# Patient Record
Sex: MALE | URBAN - METROPOLITAN AREA
[De-identification: names, ages, dates, MRNs, and addresses within clinical notes are randomized per-mention and may not be internally consistent; named-entity substitution may affect disease eponyms.]

---

## 2020-06-17 ENCOUNTER — OFFICE VISIT (OUTPATIENT)
Dept: URBAN - METROPOLITAN AREA TELEHEALTH 2 | Facility: TELEHEALTH | Age: 40
End: 2020-06-17
Payer: COMMERCIAL

## 2020-06-17 DIAGNOSIS — K51.80 CHRONIC PANCOLONIC ULCERATIVE COLITIS: ICD-10-CM

## 2020-06-17 PROCEDURE — 99202 OFFICE O/P NEW SF 15 MIN: CPT | Performed by: INTERNAL MEDICINE

## 2020-06-17 PROCEDURE — G9903 PT SCRN TBCO ID AS NON USER: HCPCS | Performed by: INTERNAL MEDICINE

## 2020-06-17 PROCEDURE — G8420 CALC BMI NORM PARAMETERS: HCPCS | Performed by: INTERNAL MEDICINE

## 2020-06-17 PROCEDURE — G8430 EC AT DOC MEDREC PT NOT ELIG: HCPCS | Performed by: INTERNAL MEDICINE

## 2020-06-17 PROCEDURE — 1036F TOBACCO NON-USER: CPT | Performed by: INTERNAL MEDICINE

## 2020-06-17 RX ORDER — MESALAMINE 1.2 G/1
4 TABLET, DELAYED RELEASE ORAL QD
Qty: 360 | Refills: 1

## 2020-06-17 RX ORDER — MESALAMINE 1.2 G/1
TABLET, DELAYED RELEASE ORAL
Qty: 0 | Refills: 0 | Status: ACTIVE | COMMUNITY
Start: 1900-01-01 | End: 1900-01-01

## 2020-06-17 NOTE — PHYSICAL EXAM HENT:
Head , normocephalic , atraumatic , Face , Face within normal limits , Ears , External ears within normal limits , Nose/Nasopharynx , External nose  normal appearance , Mouth and Throat , Oral cavity appearance normal , Head , normocephalic , atraumatic , Face , Face within normal limits , Ears , External ears within normal limits , Nose/Nasopharynx , External nose  normal appearance , Mouth and Throat , Oral cavity appearance normal

## 2020-08-12 ENCOUNTER — LAB OUTSIDE AN ENCOUNTER (OUTPATIENT)
Dept: URBAN - METROPOLITAN AREA CLINIC 118 | Facility: CLINIC | Age: 40
End: 2020-08-12

## 2020-08-13 LAB
A/G RATIO: 1.5
ALBUMIN: 4.6
ALKALINE PHOSPHATASE: 61
ALT (SGPT): 30
AST (SGOT): 28
BILIRUBIN, TOTAL: 0.3
BUN/CREATININE RATIO: 20
BUN: 19
CALCIUM: 9.8
CARBON DIOXIDE, TOTAL: 24
CHLORIDE: 102
CREATININE: 0.97
EGFR IF AFRICN AM: 113
EGFR IF NONAFRICN AM: 98
GLOBULIN, TOTAL: 3.1
GLUCOSE: 104
HEMATOCRIT: 45.3
HEMOGLOBIN: 15.4
MCH: 30.6
MCHC: 34
MCV: 90
NRBC: (no result)
PLATELETS: 385
POTASSIUM: 4.4
PROTEIN, TOTAL: 7.7
RBC: 5.04
RDW: 13.2
SODIUM: 141
WBC: 7.1

## 2020-09-30 ENCOUNTER — TELEPHONE ENCOUNTER (OUTPATIENT)
Dept: URBAN - METROPOLITAN AREA CLINIC 98 | Facility: CLINIC | Age: 40
End: 2020-09-30

## 2020-09-30 PROBLEM — 444548001: Status: ACTIVE | Noted: 2020-06-17

## 2020-10-02 ENCOUNTER — TELEPHONE ENCOUNTER (OUTPATIENT)
Dept: URBAN - METROPOLITAN AREA CLINIC 98 | Facility: CLINIC | Age: 40
End: 2020-10-02

## 2020-10-02 ENCOUNTER — OFFICE VISIT (OUTPATIENT)
Dept: URBAN - METROPOLITAN AREA SURGERY CENTER 18 | Facility: SURGERY CENTER | Age: 40
End: 2020-10-02
Payer: COMMERCIAL

## 2020-10-02 ENCOUNTER — TELEPHONE ENCOUNTER (OUTPATIENT)
Dept: URBAN - METROPOLITAN AREA CLINIC 92 | Facility: CLINIC | Age: 40
End: 2020-10-02

## 2020-10-02 ENCOUNTER — CLAIMS CREATED FROM THE CLAIM WINDOW (OUTPATIENT)
Dept: URBAN - METROPOLITAN AREA CLINIC 4 | Facility: CLINIC | Age: 40
End: 2020-10-02
Payer: COMMERCIAL

## 2020-10-02 DIAGNOSIS — K51.011: ICD-10-CM

## 2020-10-02 DIAGNOSIS — K51.919 ULCERATIVE COLITIS, UNSPECIFIED WITH UNSPECIFIED COMPLICATIONS: ICD-10-CM

## 2020-10-02 PROCEDURE — 88341 IMHCHEM/IMCYTCHM EA ADD ANTB: CPT | Performed by: PATHOLOGY

## 2020-10-02 PROCEDURE — 88342 IMHCHEM/IMCYTCHM 1ST ANTB: CPT | Performed by: PATHOLOGY

## 2020-10-02 PROCEDURE — 45380 COLONOSCOPY AND BIOPSY: CPT | Performed by: INTERNAL MEDICINE

## 2020-10-02 PROCEDURE — G9937 DIG OR SURV COLSCO: HCPCS | Performed by: INTERNAL MEDICINE

## 2020-10-02 PROCEDURE — 88305 TISSUE EXAM BY PATHOLOGIST: CPT | Performed by: PATHOLOGY

## 2020-10-02 PROCEDURE — G8907 PT DOC NO EVENTS ON DISCHARG: HCPCS | Performed by: INTERNAL MEDICINE

## 2020-10-02 RX ORDER — MESALAMINE 1.2 G/1
4 TABLET, DELAYED RELEASE ORAL QD
Qty: 360

## 2020-10-02 RX ORDER — MESALAMINE 1.2 G/1
4 TABLET, DELAYED RELEASE ORAL QD
Qty: 360 | Refills: 1 | Status: ACTIVE | COMMUNITY

## 2020-10-02 RX ORDER — CHLORDIAZEPOXIDE HYDROCHLORIDE AND CLIDINIUM BROMIDE 5; 2.5 MG/1; MG/1
1 CAPSULE ORAL BID PRN
Qty: 20 | Refills: 0 | OUTPATIENT
Start: 2020-10-02 | End: 2020-10-12

## 2020-10-02 RX ORDER — PREDNISONE 20 MG/1
3 TABLET ORAL ONCE A DAY
Qty: 90
Start: 2020-10-02

## 2020-10-02 RX ORDER — PREDNISONE 20 MG/1
3 TABLET ORAL ONCE A DAY
Qty: 90 | Refills: 1 | OUTPATIENT
Start: 2020-10-02 | End: 2020-11-30

## 2020-10-03 ENCOUNTER — LAB OUTSIDE AN ENCOUNTER (OUTPATIENT)
Dept: URBAN - METROPOLITAN AREA CLINIC 118 | Facility: CLINIC | Age: 40
End: 2020-10-03

## 2020-10-04 ENCOUNTER — TELEPHONE ENCOUNTER (OUTPATIENT)
Dept: URBAN - METROPOLITAN AREA CLINIC 98 | Facility: CLINIC | Age: 40
End: 2020-10-04

## 2020-10-12 ENCOUNTER — TELEPHONE ENCOUNTER (OUTPATIENT)
Dept: URBAN - METROPOLITAN AREA CLINIC 92 | Facility: CLINIC | Age: 40
End: 2020-10-12

## 2020-10-12 ENCOUNTER — TELEPHONE ENCOUNTER (OUTPATIENT)
Dept: URBAN - METROPOLITAN AREA CLINIC 98 | Facility: CLINIC | Age: 40
End: 2020-10-12

## 2020-10-12 LAB
C. DIFFICILE CHEK (GDH), STOOL - QDX: NEGATIVE
C. DIFFICILE TOXIN A/B, STOOL - QDX: NEGATIVE
GASTROINTESTINAL PATHOGEN: (no result)

## 2020-10-12 RX ORDER — CHLORDIAZEPOXIDE HYDROCHLORIDE AND CLIDINIUM BROMIDE 5; 2.5 MG/1; MG/1
1 CAPSULE ORAL BID PRN
Qty: 20 | Refills: 0
Start: 2020-10-02

## 2020-10-13 ENCOUNTER — TELEPHONE ENCOUNTER (OUTPATIENT)
Dept: URBAN - METROPOLITAN AREA CLINIC 98 | Facility: CLINIC | Age: 40
End: 2020-10-13

## 2020-10-14 ENCOUNTER — TELEPHONE ENCOUNTER (OUTPATIENT)
Dept: URBAN - METROPOLITAN AREA CLINIC 98 | Facility: CLINIC | Age: 40
End: 2020-10-14

## 2020-10-14 RX ORDER — CHLORDIAZEPOXIDE HYDROCHLORIDE AND CLIDINIUM BROMIDE 5; 2.5 MG/1; MG/1
1 CAPSULE ORAL BID PRN
Qty: 20 | Refills: 0 | Status: ACTIVE | COMMUNITY
Start: 2020-10-02

## 2020-10-14 RX ORDER — PREDNISONE 20 MG/1
3 TABLET ORAL ONCE A DAY
Qty: 90 | Status: ACTIVE | COMMUNITY
Start: 2020-10-02

## 2020-10-14 RX ORDER — HYOSCYAMINE SULFATE 0.12 MG/1
1 TABLET AS NEEDED TABLET ORAL
Qty: 30 | Refills: 1 | OUTPATIENT

## 2020-10-14 RX ORDER — MESALAMINE 1.2 G/1
4 TABLET, DELAYED RELEASE ORAL QD
Qty: 360 | Refills: 1 | Status: ACTIVE | COMMUNITY

## 2020-10-20 ENCOUNTER — TELEPHONE ENCOUNTER (OUTPATIENT)
Dept: URBAN - METROPOLITAN AREA CLINIC 98 | Facility: CLINIC | Age: 40
End: 2020-10-20

## 2020-10-21 ENCOUNTER — OUT OF OFFICE VISIT (OUTPATIENT)
Dept: URBAN - METROPOLITAN AREA MEDICAL CENTER 27 | Facility: MEDICAL CENTER | Age: 40
End: 2020-10-21
Payer: COMMERCIAL

## 2020-10-21 DIAGNOSIS — D64.89 ANEMIA DUE TO OTHER CAUSE: ICD-10-CM

## 2020-10-21 DIAGNOSIS — R93.3 ABN FINDINGS-GI TRACT: ICD-10-CM

## 2020-10-21 DIAGNOSIS — D72.829 ELEVATED WBC COUNT: ICD-10-CM

## 2020-10-21 DIAGNOSIS — K51.80 CHRONIC PANCOLONIC ULCERATIVE COLITIS: ICD-10-CM

## 2020-10-21 PROCEDURE — 99254 IP/OBS CNSLTJ NEW/EST MOD 60: CPT | Performed by: INTERNAL MEDICINE

## 2020-10-21 PROCEDURE — 99232 SBSQ HOSP IP/OBS MODERATE 35: CPT | Performed by: INTERNAL MEDICINE

## 2020-10-23 ENCOUNTER — OUT OF OFFICE VISIT (OUTPATIENT)
Dept: URBAN - METROPOLITAN AREA MEDICAL CENTER 27 | Facility: MEDICAL CENTER | Age: 40
End: 2020-10-23
Payer: COMMERCIAL

## 2020-10-23 DIAGNOSIS — D72.828 HIGH BLOOD MONOCYTE COUNT: ICD-10-CM

## 2020-10-23 DIAGNOSIS — D64.89 ANEMIA DUE TO OTHER CAUSE: ICD-10-CM

## 2020-10-23 DIAGNOSIS — D47.3 THROMBOCYTOSIS: ICD-10-CM

## 2020-10-23 DIAGNOSIS — K51.018 CHRONIC ULCERATIVE ILEOCOLITIS WITH OTHER COMPLICATION: ICD-10-CM

## 2020-10-23 PROCEDURE — 99233 SBSQ HOSP IP/OBS HIGH 50: CPT | Performed by: INTERNAL MEDICINE

## 2020-10-24 ENCOUNTER — OUT OF OFFICE VISIT (OUTPATIENT)
Dept: URBAN - METROPOLITAN AREA MEDICAL CENTER 27 | Facility: MEDICAL CENTER | Age: 40
End: 2020-10-24
Payer: COMMERCIAL

## 2020-10-24 DIAGNOSIS — K92.1 BLACK STOOL: ICD-10-CM

## 2020-10-24 DIAGNOSIS — R97.20 ELEVATED PSA: ICD-10-CM

## 2020-10-24 DIAGNOSIS — K51.018 CHRONIC ULCERATIVE ILEOCOLITIS WITH OTHER COMPLICATION: ICD-10-CM

## 2020-10-24 DIAGNOSIS — R10.84 ABDOMINAL CRAMPING, GENERALIZED: ICD-10-CM

## 2020-10-24 PROCEDURE — 99232 SBSQ HOSP IP/OBS MODERATE 35: CPT | Performed by: INTERNAL MEDICINE

## 2020-10-26 ENCOUNTER — OFFICE VISIT (OUTPATIENT)
Dept: URBAN - METROPOLITAN AREA TELEHEALTH 2 | Facility: TELEHEALTH | Age: 40
End: 2020-10-26
Payer: COMMERCIAL

## 2020-10-26 DIAGNOSIS — K92.1 HEMATOCHEZIA: ICD-10-CM

## 2020-10-26 DIAGNOSIS — R19.7 DIARRHEA: ICD-10-CM

## 2020-10-26 DIAGNOSIS — R10.84 ABDOMINAL CRAMPING, GENERALIZED: ICD-10-CM

## 2020-10-26 DIAGNOSIS — K51.00 UNIVERSAL ULCERATIVE COLITIS WITHOUT COMPLICATION: ICD-10-CM

## 2020-10-26 PROCEDURE — G9903 PT SCRN TBCO ID AS NON USER: HCPCS | Performed by: INTERNAL MEDICINE

## 2020-10-26 PROCEDURE — G9622 NO UNHEAL ETOH USER: HCPCS | Performed by: INTERNAL MEDICINE

## 2020-10-26 PROCEDURE — 3017F COLORECTAL CA SCREEN DOC REV: CPT | Performed by: INTERNAL MEDICINE

## 2020-10-26 PROCEDURE — G8482 FLU IMMUNIZE ORDER/ADMIN: HCPCS | Performed by: INTERNAL MEDICINE

## 2020-10-26 PROCEDURE — 99215 OFFICE O/P EST HI 40 MIN: CPT | Performed by: INTERNAL MEDICINE

## 2020-10-26 PROCEDURE — G8427 DOCREV CUR MEDS BY ELIG CLIN: HCPCS | Performed by: INTERNAL MEDICINE

## 2020-10-26 PROCEDURE — 1036F TOBACCO NON-USER: CPT | Performed by: INTERNAL MEDICINE

## 2020-10-26 PROCEDURE — G8418 CALC BMI BLW LOW PARAM F/U: HCPCS | Performed by: INTERNAL MEDICINE

## 2020-10-26 RX ORDER — CHLORDIAZEPOXIDE HYDROCHLORIDE AND CLIDINIUM BROMIDE 5; 2.5 MG/1; MG/1
1 CAPSULE ORAL BID PRN
Qty: 20 | Refills: 0 | Status: ACTIVE | COMMUNITY
Start: 2020-10-02

## 2020-10-26 RX ORDER — MESALAMINE 1.2 G/1
4 TABLET, DELAYED RELEASE ORAL QD
Qty: 360 | Refills: 1 | Status: ACTIVE | COMMUNITY

## 2020-10-26 RX ORDER — HYOSCYAMINE SULFATE 0.12 MG/1
1 TABLET AS NEEDED TABLET ORAL
Qty: 30 | Refills: 1 | Status: ACTIVE | COMMUNITY

## 2020-10-26 RX ORDER — PREDNISONE 10 MG/1
4 TABLET TABLET ORAL ONCE A DAY
Qty: 120 TABLET | Refills: 0 | OUTPATIENT
Start: 2020-10-26 | End: 2020-11-24

## 2020-10-26 RX ORDER — BALSALAZIDE DISODIUM 750 MG/1
9 CAPSULES CAPSULE ORAL QD
Qty: 270 CAPSULE | Refills: 11 | OUTPATIENT
Start: 2020-10-26 | End: 2021-10-21

## 2020-10-26 RX ORDER — PREDNISONE 20 MG/1
3 TABLET ORAL ONCE A DAY
Qty: 90 | Status: ACTIVE | COMMUNITY
Start: 2020-10-02

## 2020-10-26 NOTE — HPI-TODAY'S VISIT:
Cory Hilton is a 41 y/o indiv with pan-UC, currently on mesalamine (4 tab), here for evaluation of refractory dz. . Pt is referred by Dr. Chen. . Pt was diagnosed with UC 1999.  He was started on azulfidine/sulfasalazine, developed hives on it.  Eventually he was started on another ASA.  Eventually he started on mesalamine.  He was hospitalized in 2020 for flares. . Today on 10/26/2020, pt reports that he was started on prednisone 40mg.  He was recently hospitalized at [facility] for dehydration and abdominal pain.  He was found to have low electrolytes levels and given IV steroids and IV pain control.  He also has had a lot of rectal bleeding.  His Hgb is 8.8 currently (at [facility]).  He has up to 4 BM per day, with blood and mucus. . SH: no smoke/occasional etoh;  FH: No IBD PMH: UC; hyperlipidemia; enlarged prostate . 10/2020: Colon, Random Biopsy: DIFFUSE CHRONIC ACTIVE COLITIS WITH ULCER AND GRANULATION TISSUE, CONSISTENT WITH ULCERATIVE COLITIS. See Comment. Negative for Infectious Organisms, Dysplasia or Malignancy. COMMENT: The biopsies show a diffuse and uniform chronic active colitis with a universal involvement of the rectum and absence of granulomas, supporting a diagnosis of ulcerative colitis. The presence of histological features of chronicity excludes acute infectious colitis. . 9/2019: A patchy area of mucosa in the terminal ileum was mildly nodular.  Biopsies were taken with a cold forceps for histology. Estimated blood loss was minimal. The remainder of the exam in the terminal ileum was normal. A patchy area of mild to moderate erythematous, granular and scarred mucosa was found in the rectum, in the recto- sigmoid colon, in the sigmoid colon, in the descending colon, at the splenic flexure, in the transverse colon, at the hepatic flexure, in the ascending colon and in the cecum.  Biopsies were taken with a cold forceps for histology. Estimated blood loss was minimal. The exam was otherwise normal throughout the examined colon. . A.	Terminal	I leum	,	Biopsy: -No	diagnostic	abnormality. -Normal	villous	architecture. -There	is	no	evidence	of	active	or	chronic	enteritis. -Negative	for	dysplasia	and	malignancy. B.	Random	Colon	,	Biopsy: -Chronic	active	colitis,	mild,	consistent	with	history	of	Ulcerative	Colitis. -No	granulomas,	negative	for	dysplasia	or	malignancy. . 10/2018: A.	Random	Colon	,	Biopsy: -Chronic	active	colitis,	mild,	consistent	with	history	of	Ulcerative	Colitis. -No	granulomas,	negative	for	dysplasia	or	malignancy. . 6/2017: A. TERMINAL ILEUM (BIOPSY), R/O COLITIS ILEAL MUCOSA WITH NO SIGNIFICANT HISTOPATHOLOGIC CHANGES. Negative for ileitis. B. CECUM (BIOPSY), R/O COLITIS COLONIC MUCOSA WITH NO SIGNIFICANT HISTOPATHOLOGIC CHANGES. Negative for crypt distortion, disarray, and significant inflammation. Negative for dysplasia. C. ASCENDING COLON (BIOPSY), R/O COLITIS COLONIC MUCOSA WITH NO SIGNIFICANT HISTOPATHOLOGIC CHANGES. Negative for crypt distortion, disarray, and significant inflammation. Negative for dysplasia. D. TRANSVERSE COLON (BIOPSY), R/O COLITIS COLONIC MUCOSA WITH NO SIGNIFICANT HISTOPATHOLOGIC CHANGES. Negative for crypt distortion, disarray, and significant inflammation. Negative for dysplasia. E. DESCENDING COLON (BIOPSY), R/O COLITIS COLONIC MUCOSA WITH NO SIGNIFICANT HISTOPATHOLOGIC CHANGES. Negative for crypt distortion, disarray, and significant inflammation. Negative for dysplasia. F. SIGMOID COLON (BIOPSY), R/O COLITIS COLONIC MUCOSA WITH FOCAL ACUTE HEMORRHAGE AND NO SIGNIFICANT HISTOPATHOLOGIC  CHANGES. Negative for crypt distortion, disarray, and significant inflammation. Negative for dysplasia. G. RECTUM (BIOPSY), R/O COLITIS QUIESCENT ULCERATIVE COLITIS WITH FOCAL LYMPHOID AGGREGATE. Negative for acute inflammation and dysplasia. . 6/2016: A. CECUM-BIOPSY MILD CHRONIC ULCERATIVE COLITIS WITH MINIMAL ACTIVITY.  NO EVIDENCE OF DYSPLASIA. B. ASCENDING COLON-BIOPSY CONSISTENT WITH QUIESCENT ULCERATIVE COLITIS, NO ACTIVE INFLAMMATION OR DYSPLASIA. C. TRANSVERSE COLON-BIOPSY CONSISTENT WITH QUIESCENT ULCERATIVE COLITIS, NO ACTIVE INFLAMMATION OR DYSPLASIA. D. DESCENDING COLON-BIOPSY MILD CHRONIC ULCERATIVE COLITIS WITH MINIMAL ACTIVITY.  NO EVIDENCE OF DYSPLASIA. E. SIGMOID COLON-BIOPSY MILD CHRONIC ULCERATIVE COLITIS WITH MINIMAL ACTIVITY.  NO EVIDENCE OF DYSPLASIA. F. RECTUM-BIOPSY MILD CHRONIC ULCERATIVE PROCTITIS WITH MINIMAL ACTIVITY.  NO EVIDENCE OF DYSPLASIA. .

## 2020-11-10 ENCOUNTER — WEB ENCOUNTER (OUTPATIENT)
Dept: URBAN - METROPOLITAN AREA CLINIC 118 | Facility: CLINIC | Age: 40
End: 2020-11-10

## 2020-11-11 ENCOUNTER — OFFICE VISIT (OUTPATIENT)
Dept: URBAN - METROPOLITAN AREA TELEHEALTH 2 | Facility: TELEHEALTH | Age: 40
End: 2020-11-11
Payer: COMMERCIAL

## 2020-11-11 DIAGNOSIS — D50.8 OTHER IRON DEFICIENCY ANEMIAS: ICD-10-CM

## 2020-11-11 DIAGNOSIS — K92.1 HEMATOCHEZIA: ICD-10-CM

## 2020-11-11 DIAGNOSIS — R19.7 DIARRHEA: ICD-10-CM

## 2020-11-11 DIAGNOSIS — R10.84 ABDOMINAL PAIN, GENERALIZED: ICD-10-CM

## 2020-11-11 DIAGNOSIS — K51.80 OTHER ULCERATIVE COLITIS WITHOUT COMPLICATION: ICD-10-CM

## 2020-11-11 PROCEDURE — 1036F TOBACCO NON-USER: CPT | Performed by: INTERNAL MEDICINE

## 2020-11-11 PROCEDURE — 3017F COLORECTAL CA SCREEN DOC REV: CPT | Performed by: INTERNAL MEDICINE

## 2020-11-11 PROCEDURE — G9622 NO UNHEAL ETOH USER: HCPCS | Performed by: INTERNAL MEDICINE

## 2020-11-11 PROCEDURE — 99215 OFFICE O/P EST HI 40 MIN: CPT | Performed by: INTERNAL MEDICINE

## 2020-11-11 PROCEDURE — G8420 CALC BMI NORM PARAMETERS: HCPCS | Performed by: INTERNAL MEDICINE

## 2020-11-11 PROCEDURE — 99214 OFFICE O/P EST MOD 30 MIN: CPT | Performed by: INTERNAL MEDICINE

## 2020-11-11 PROCEDURE — G8427 DOCREV CUR MEDS BY ELIG CLIN: HCPCS | Performed by: INTERNAL MEDICINE

## 2020-11-11 PROCEDURE — G8482 FLU IMMUNIZE ORDER/ADMIN: HCPCS | Performed by: INTERNAL MEDICINE

## 2020-11-11 PROCEDURE — G9903 PT SCRN TBCO ID AS NON USER: HCPCS | Performed by: INTERNAL MEDICINE

## 2020-11-11 RX ORDER — PREDNISONE 10 MG/1
4 TABLET TABLET ORAL ONCE A DAY
Qty: 120 TABLET | Refills: 0 | Status: ACTIVE | COMMUNITY
Start: 2020-10-26 | End: 2020-11-24

## 2020-11-11 RX ORDER — BALSALAZIDE DISODIUM 750 MG/1
9 CAPSULES CAPSULE ORAL QD
Qty: 270 CAPSULE | Refills: 11 | Status: ACTIVE | COMMUNITY
Start: 2020-10-26 | End: 2021-10-21

## 2020-11-11 RX ORDER — MESALAMINE 1.2 G/1
4 TABLET, DELAYED RELEASE ORAL QD
Qty: 360 | Refills: 1 | Status: ACTIVE | COMMUNITY

## 2020-11-11 RX ORDER — PREDNISONE 10 MG/1
4 TABLET TABLET ORAL ONCE A DAY
Qty: 120 TABLET | Refills: 0 | OUTPATIENT

## 2020-11-11 RX ORDER — HYOSCYAMINE SULFATE 0.12 MG/1
1 TABLET AS NEEDED TABLET ORAL
Qty: 30 | Refills: 1 | Status: ACTIVE | COMMUNITY

## 2020-11-11 RX ORDER — CHLORDIAZEPOXIDE HYDROCHLORIDE AND CLIDINIUM BROMIDE 5; 2.5 MG/1; MG/1
1 CAPSULE ORAL BID PRN
Qty: 20 | Refills: 0 | Status: ACTIVE | COMMUNITY
Start: 2020-10-02

## 2020-11-11 RX ORDER — BALSALAZIDE DISODIUM 750 MG/1
9 CAPSULES CAPSULE ORAL QD
Qty: 810 CAPSULE | Refills: 4 | OUTPATIENT

## 2020-11-11 RX ORDER — PREDNISONE 20 MG/1
3 TABLET ORAL ONCE A DAY
Qty: 90 | Status: ACTIVE | COMMUNITY
Start: 2020-10-02

## 2020-11-11 NOTE — HPI-OTHER HISTORIES
Cory Hilton is a 39 y/o indiv with pan-UC, currently on Balasalizde 9 tab, here for evaluation of refractory dz. . Pt is referred by Dr. Chen. . Pt was diagnosed with UC 1999.  He was started on azulfidine/sulfasalazine, developed hives on it.  Eventually he was started on another ASA.  Eventually he started on mesalamine.  He was hospitalized in 2020 for flares. . Previously on 10/26/2020, pt reports that he was started on prednisone 40mg.  He was recently hospitalized at [facility] for dehydration and abdominal pain.  He was found to have low electrolytes levels and given IV steroids and IV pain control.  He also has had a lot of rectal bleeding.  His Hgb is 8.8 currently (at [facility]).  He has up to 4 BM per day, with blood and mucus. . Today 11/11/2020, pt reports that originally after hospital discharge, he was having severe pain and discomfort and lost 25lbs of weight.  He then started to force himself to walk around.  He has also had difficulty eating and drinking.  About 1 week ago, things started to get better 3-4 BM per day. He is on 30mg of prednisone.  No blood or mucus in the stool.  He has pasty stool. .  SH: no smoke/occasional etoh;  FH: No IBD PMH: UC; hyperlipidemia; enlarged prostate . 10/2020: Colon, Random Biopsy: DIFFUSE CHRONIC ACTIVE COLITIS WITH ULCER AND GRANULATION TISSUE, CONSISTENT WITH ULCERATIVE COLITIS. See Comment. Negative for Infectious Organisms, Dysplasia or Malignancy. COMMENT: The biopsies show a diffuse and uniform chronic active colitis with a universal involvement of the rectum and absence of granulomas, supporting a diagnosis of ulcerative colitis. The presence of histological features of chronicity excludes acute infectious colitis. . 9/2019: A patchy area of mucosa in the terminal ileum was mildly nodular.  Biopsies were taken with a cold forceps for histology. Estimated blood loss was minimal. The remainder of the exam in the terminal ileum was normal. A patchy area of mild to moderate erythematous, granular and scarred mucosa was found in the rectum, in the recto- sigmoid colon, in the sigmoid colon, in the descending colon, at the splenic flexure, in the transverse colon, at the hepatic flexure, in the ascending colon and in the cecum.  Biopsies were taken with a cold forceps for histology. Estimated blood loss was minimal. The exam was otherwise normal throughout the examined colon. . A.	Terminal	I leum	,	Biopsy: -No	diagnostic	abnormality. -Normal	villous	architecture. -There	is	no	evidence	of	active	or	chronic	enteritis. -Negative	for	dysplasia	and	malignancy. B.	Random	Colon	,	Biopsy: -Chronic	active	colitis,	mild,	consistent	with	history	of	Ulcerative	Colitis. -No	granulomas,	negative	for	dysplasia	or	malignancy. . 10/2018: A.	Random	Colon	,	Biopsy: -Chronic	active	colitis,	mild,	consistent	with	history	of	Ulcerative	Colitis. -No	granulomas,	negative	for	dysplasia	or	malignancy. . 6/2017: A. TERMINAL ILEUM (BIOPSY), R/O COLITIS ILEAL MUCOSA WITH NO SIGNIFICANT HISTOPATHOLOGIC CHANGES. Negative for ileitis. B. CECUM (BIOPSY), R/O COLITIS COLONIC MUCOSA WITH NO SIGNIFICANT HISTOPATHOLOGIC CHANGES. Negative for crypt distortion, disarray, and significant inflammation. Negative for dysplasia. C. ASCENDING COLON (BIOPSY), R/O COLITIS COLONIC MUCOSA WITH NO SIGNIFICANT HISTOPATHOLOGIC CHANGES. Negative for crypt distortion, disarray, and significant inflammation. Negative for dysplasia. D. TRANSVERSE COLON (BIOPSY), R/O COLITIS COLONIC MUCOSA WITH NO SIGNIFICANT HISTOPATHOLOGIC CHANGES. Negative for crypt distortion, disarray, and significant inflammation. Negative for dysplasia. E. DESCENDING COLON (BIOPSY), R/O COLITIS COLONIC MUCOSA WITH NO SIGNIFICANT HISTOPATHOLOGIC CHANGES. Negative for crypt distortion, disarray, and significant inflammation. Negative for dysplasia. F. SIGMOID COLON (BIOPSY), R/O COLITIS COLONIC MUCOSA WITH FOCAL ACUTE HEMORRHAGE AND NO SIGNIFICANT HISTOPATHOLOGIC  CHANGES. Negative for crypt distortion, disarray, and significant inflammation. Negative for dysplasia. G. RECTUM (BIOPSY), R/O COLITIS QUIESCENT ULCERATIVE COLITIS WITH FOCAL LYMPHOID AGGREGATE. Negative for acute inflammation and dysplasia. . 6/2016: A. CECUM-BIOPSY MILD CHRONIC ULCERATIVE COLITIS WITH MINIMAL ACTIVITY.  NO EVIDENCE OF DYSPLASIA. B. ASCENDING COLON-BIOPSY CONSISTENT WITH QUIESCENT ULCERATIVE COLITIS, NO ACTIVE INFLAMMATION OR DYSPLASIA. C. TRANSVERSE COLON-BIOPSY CONSISTENT WITH QUIESCENT ULCERATIVE COLITIS, NO ACTIVE INFLAMMATION OR DYSPLASIA. D. DESCENDING COLON-BIOPSY MILD CHRONIC ULCERATIVE COLITIS WITH MINIMAL ACTIVITY.  NO EVIDENCE OF DYSPLASIA. E. SIGMOID COLON-BIOPSY MILD CHRONIC ULCERATIVE COLITIS WITH MINIMAL ACTIVITY.  NO EVIDENCE OF DYSPLASIA. F. RECTUM-BIOPSY MILD CHRONIC ULCERATIVE PROCTITIS WITH MINIMAL ACTIVITY.  NO EVIDENCE OF DYSPLASIA. .

## 2020-11-14 LAB
BASO (ABSOLUTE): 0
BASOS: 0
EOS (ABSOLUTE): 0
EOS: 0
FERRITIN, SERUM: 32
HEMATOCRIT: 30.4
HEMATOLOGY COMMENTS:: (no result)
HEMOGLOBIN: 9.2
IMMATURE CELLS: (no result)
IMMATURE GRANS (ABS): 0.1
IMMATURE GRANULOCYTES: 1
IRON BIND.CAP.(TIBC): 307
IRON SATURATION: 7
IRON: 22
LYMPHS (ABSOLUTE): 0.4
LYMPHS: 4
MCH: 25.2
MCHC: 30.3
MCV: 83
MONOCYTES(ABSOLUTE): 0.2
MONOCYTES: 2
NEUTROPHILS (ABSOLUTE): 11.3
NEUTROPHILS: 93
NRBC: 1
PLATELETS: 656
RBC: 3.65
RDW: 15.2
UIBC: 285
WBC: 12

## 2020-11-16 ENCOUNTER — WEB ENCOUNTER (OUTPATIENT)
Dept: URBAN - METROPOLITAN AREA CLINIC 118 | Facility: CLINIC | Age: 40
End: 2020-11-16

## 2020-11-16 ENCOUNTER — WEB ENCOUNTER (OUTPATIENT)
Dept: URBAN - METROPOLITAN AREA CLINIC 96 | Facility: CLINIC | Age: 40
End: 2020-11-16

## 2020-11-16 RX ORDER — PREDNISONE 20 MG/1
3 TABLET ORAL ONCE A DAY
Qty: 90 | Status: ACTIVE | COMMUNITY
Start: 2020-10-02

## 2020-11-16 RX ORDER — HYOSCYAMINE SULFATE 0.12 MG/1
1 TABLET AS NEEDED TABLET ORAL
Qty: 120 | Refills: 3 | OUTPATIENT
Start: 2020-12-10 | End: 2021-04-09

## 2020-11-16 RX ORDER — HYOSCYAMINE SULFATE 0.12 MG/1
1 TABLET AS NEEDED TABLET ORAL
Qty: 30 | Refills: 1 | Status: ACTIVE | COMMUNITY

## 2020-11-16 RX ORDER — PREDNISONE 10 MG/1
1 TABLET TABLET ORAL ONCE A DAY
Qty: 30 TABLET | Refills: 0 | OUTPATIENT
Start: 2020-12-10 | End: 2021-01-09

## 2020-11-16 RX ORDER — MESALAMINE 1.2 G/1
4 TABLET, DELAYED RELEASE ORAL QD
Qty: 360 | Refills: 1 | Status: ACTIVE | COMMUNITY

## 2020-11-16 RX ORDER — BALSALAZIDE DISODIUM 750 MG/1
9 CAPSULES CAPSULE ORAL QD
Qty: 810 CAPSULE | Refills: 4 | Status: ACTIVE | COMMUNITY

## 2020-11-16 RX ORDER — CHLORDIAZEPOXIDE HYDROCHLORIDE AND CLIDINIUM BROMIDE 5; 2.5 MG/1; MG/1
1 CAPSULE ORAL BID PRN
Qty: 20 | Refills: 0 | Status: ACTIVE | COMMUNITY
Start: 2020-10-02

## 2020-11-16 RX ORDER — PREDNISONE 10 MG/1
4 TABLET TABLET ORAL ONCE A DAY
Qty: 120 TABLET | Refills: 0 | Status: ACTIVE | COMMUNITY

## 2020-11-18 ENCOUNTER — LAB OUTSIDE AN ENCOUNTER (OUTPATIENT)
Dept: URBAN - METROPOLITAN AREA TELEHEALTH 2 | Facility: TELEHEALTH | Age: 40
End: 2020-11-18

## 2020-12-22 ENCOUNTER — OFFICE VISIT (OUTPATIENT)
Dept: URBAN - METROPOLITAN AREA CLINIC 97 | Facility: CLINIC | Age: 40
End: 2020-12-22
Payer: COMMERCIAL

## 2020-12-22 VITALS
WEIGHT: 155 LBS | HEIGHT: 69 IN | DIASTOLIC BLOOD PRESSURE: 83 MMHG | TEMPERATURE: 97.6 F | RESPIRATION RATE: 18 BRPM | SYSTOLIC BLOOD PRESSURE: 119 MMHG | BODY MASS INDEX: 22.96 KG/M2

## 2020-12-22 DIAGNOSIS — D50.9 ANEMIA, IRON DEFICIENCY: ICD-10-CM

## 2020-12-22 PROCEDURE — 96365 THER/PROPH/DIAG IV INF INIT: CPT | Performed by: INTERNAL MEDICINE

## 2020-12-22 RX ORDER — MESALAMINE 1.2 G/1
4 TABLET, DELAYED RELEASE ORAL QD
Qty: 360 | Refills: 1 | Status: ACTIVE | COMMUNITY

## 2020-12-22 RX ORDER — HYOSCYAMINE SULFATE 0.12 MG/1
1 TABLET AS NEEDED TABLET ORAL
Qty: 30 | Refills: 1 | Status: ACTIVE | COMMUNITY

## 2020-12-22 RX ORDER — HYOSCYAMINE SULFATE 0.12 MG/1
1 TABLET AS NEEDED TABLET ORAL
Qty: 120 | Refills: 3 | Status: ACTIVE | COMMUNITY
Start: 2020-12-10 | End: 2021-04-09

## 2020-12-22 RX ORDER — PREDNISONE 10 MG/1
4 TABLET TABLET ORAL ONCE A DAY
Qty: 120 TABLET | Refills: 0 | Status: ACTIVE | COMMUNITY

## 2020-12-22 RX ORDER — BALSALAZIDE DISODIUM 750 MG/1
9 CAPSULES CAPSULE ORAL QD
Qty: 810 CAPSULE | Refills: 4 | Status: ACTIVE | COMMUNITY

## 2020-12-22 RX ORDER — PREDNISONE 10 MG/1
1 TABLET TABLET ORAL ONCE A DAY
Qty: 30 TABLET | Refills: 0 | Status: ACTIVE | COMMUNITY
Start: 2020-12-10 | End: 2021-01-09

## 2020-12-22 RX ORDER — PREDNISONE 20 MG/1
3 TABLET ORAL ONCE A DAY
Qty: 90 | Status: ACTIVE | COMMUNITY
Start: 2020-10-02

## 2020-12-22 RX ORDER — CHLORDIAZEPOXIDE HYDROCHLORIDE AND CLIDINIUM BROMIDE 5; 2.5 MG/1; MG/1
1 CAPSULE ORAL BID PRN
Qty: 20 | Refills: 0 | Status: ACTIVE | COMMUNITY
Start: 2020-10-02

## 2020-12-29 ENCOUNTER — OFFICE VISIT (OUTPATIENT)
Dept: URBAN - METROPOLITAN AREA CLINIC 97 | Facility: CLINIC | Age: 40
End: 2020-12-29
Payer: COMMERCIAL

## 2020-12-29 VITALS
SYSTOLIC BLOOD PRESSURE: 122 MMHG | BODY MASS INDEX: 22.96 KG/M2 | HEIGHT: 69 IN | WEIGHT: 155 LBS | DIASTOLIC BLOOD PRESSURE: 87 MMHG | TEMPERATURE: 96.4 F | RESPIRATION RATE: 18 BRPM

## 2020-12-29 DIAGNOSIS — D50.8 ANEMIA, DUE TO INADEQUATE IRON INTAKE: ICD-10-CM

## 2020-12-29 PROCEDURE — 96365 THER/PROPH/DIAG IV INF INIT: CPT | Performed by: INTERNAL MEDICINE

## 2020-12-29 RX ORDER — PREDNISONE 20 MG/1
3 TABLET ORAL ONCE A DAY
Qty: 90 | Status: ACTIVE | COMMUNITY
Start: 2020-10-02

## 2020-12-29 RX ORDER — HYOSCYAMINE SULFATE 0.12 MG/1
1 TABLET AS NEEDED TABLET ORAL
Qty: 30 | Refills: 1 | Status: ACTIVE | COMMUNITY

## 2020-12-29 RX ORDER — PREDNISONE 10 MG/1
4 TABLET TABLET ORAL ONCE A DAY
Qty: 120 TABLET | Refills: 0 | Status: ACTIVE | COMMUNITY

## 2020-12-29 RX ORDER — PREDNISONE 10 MG/1
1 TABLET TABLET ORAL ONCE A DAY
Qty: 30 TABLET | Refills: 0 | Status: ACTIVE | COMMUNITY
Start: 2020-12-10 | End: 2021-01-09

## 2020-12-29 RX ORDER — HYOSCYAMINE SULFATE 0.12 MG/1
1 TABLET AS NEEDED TABLET ORAL
Qty: 120 | Refills: 3 | Status: ACTIVE | COMMUNITY
Start: 2020-12-10 | End: 2021-04-09

## 2020-12-29 RX ORDER — CHLORDIAZEPOXIDE HYDROCHLORIDE AND CLIDINIUM BROMIDE 5; 2.5 MG/1; MG/1
1 CAPSULE ORAL BID PRN
Qty: 20 | Refills: 0 | Status: ACTIVE | COMMUNITY
Start: 2020-10-02

## 2020-12-29 RX ORDER — MESALAMINE 1.2 G/1
4 TABLET, DELAYED RELEASE ORAL QD
Qty: 360 | Refills: 1 | Status: ACTIVE | COMMUNITY

## 2020-12-29 RX ORDER — BALSALAZIDE DISODIUM 750 MG/1
9 CAPSULES CAPSULE ORAL QD
Qty: 810 CAPSULE | Refills: 4 | Status: ACTIVE | COMMUNITY

## 2021-02-04 ENCOUNTER — OFFICE VISIT (OUTPATIENT)
Dept: URBAN - METROPOLITAN AREA TELEHEALTH 2 | Facility: TELEHEALTH | Age: 41
End: 2021-02-04
Payer: COMMERCIAL

## 2021-02-04 DIAGNOSIS — R10.84 ABDOMINAL CRAMPING, GENERALIZED: ICD-10-CM

## 2021-02-04 DIAGNOSIS — D50.9 IRON DEFICIENCY ANEMIA: ICD-10-CM

## 2021-02-04 DIAGNOSIS — K51.00 UNIVERSAL ULCERATIVE COLITIS WITHOUT COMPLICATION: ICD-10-CM

## 2021-02-04 DIAGNOSIS — R19.7 DIARRHEA: ICD-10-CM

## 2021-02-04 PROCEDURE — G8420 CALC BMI NORM PARAMETERS: HCPCS | Performed by: INTERNAL MEDICINE

## 2021-02-04 PROCEDURE — G9622 NO UNHEAL ETOH USER: HCPCS | Performed by: INTERNAL MEDICINE

## 2021-02-04 PROCEDURE — 99215 OFFICE O/P EST HI 40 MIN: CPT | Performed by: INTERNAL MEDICINE

## 2021-02-04 PROCEDURE — 3017F COLORECTAL CA SCREEN DOC REV: CPT | Performed by: INTERNAL MEDICINE

## 2021-02-04 PROCEDURE — G8427 DOCREV CUR MEDS BY ELIG CLIN: HCPCS | Performed by: INTERNAL MEDICINE

## 2021-02-04 PROCEDURE — G9903 PT SCRN TBCO ID AS NON USER: HCPCS | Performed by: INTERNAL MEDICINE

## 2021-02-04 PROCEDURE — G8482 FLU IMMUNIZE ORDER/ADMIN: HCPCS | Performed by: INTERNAL MEDICINE

## 2021-02-04 PROCEDURE — 1036F TOBACCO NON-USER: CPT | Performed by: INTERNAL MEDICINE

## 2021-02-04 RX ORDER — BALSALAZIDE DISODIUM 750 MG/1
9 CAPSULES CAPSULE ORAL QD
Qty: 810 CAPSULE | Refills: 4 | Status: ACTIVE | COMMUNITY

## 2021-02-04 RX ORDER — PREDNISONE 20 MG/1
3 TABLET ORAL ONCE A DAY
Qty: 90 | Status: DISCONTINUED | COMMUNITY
Start: 2020-10-02

## 2021-02-04 RX ORDER — PREDNISONE 10 MG/1
4 TABLET TABLET ORAL ONCE A DAY
Qty: 120 TABLET | Refills: 0 | OUTPATIENT

## 2021-02-04 RX ORDER — PREDNISONE 10 MG/1
4 TABLET TABLET ORAL ONCE A DAY
Qty: 120 TABLET | Refills: 0 | Status: ACTIVE | COMMUNITY

## 2021-02-04 RX ORDER — HYOSCYAMINE SULFATE 0.12 MG/1
1 TABLET AS NEEDED TABLET ORAL
Qty: 30 | Refills: 1 | Status: ACTIVE | COMMUNITY

## 2021-02-04 RX ORDER — CHLORDIAZEPOXIDE HYDROCHLORIDE AND CLIDINIUM BROMIDE 5; 2.5 MG/1; MG/1
1 CAPSULE ORAL BID PRN
Qty: 20 | Refills: 0 | Status: ACTIVE | COMMUNITY
Start: 2020-10-02

## 2021-02-04 RX ORDER — HYOSCYAMINE SULFATE 0.12 MG/1
1 TABLET AS NEEDED TABLET ORAL
Qty: 120 | Refills: 3 | Status: ACTIVE | COMMUNITY
Start: 2020-12-10 | End: 2021-04-09

## 2021-02-04 RX ORDER — BALSALAZIDE DISODIUM 750 MG/1
9 CAPSULES CAPSULE ORAL QD
Qty: 810 CAPSULE | Refills: 4 | OUTPATIENT

## 2021-02-04 NOTE — HPI-TODAY'S VISIT:
Cory Hilton is a 41 y/o indiv with pan-UC, currently on Balasalizde 9 tab, here for evaluation of refractory dz. . Pt is referred by Dr. Chen. . Pt was diagnosed with UC 1999.  He was started on azulfidine/sulfasalazine, developed hives on it.  Eventually he was started on another ASA.  Eventually he started on mesalamine.  He was hospitalized in 2020 for flares. . Previously on 10/26/2020, pt reports that he was started on prednisone 40mg.  He was recently hospitalized at [facility] for dehydration and abdominal pain.  He was found to have low electrolytes levels and given IV steroids and IV pain control.  He also has had a lot of rectal bleeding.  His Hgb is 8.8 currently (at [facility]).  He has up to 4 BM per day, with blood and mucus. . Previously 11/11/2020, pt reports that originally after hospital discharge, he was having severe pain and discomfort and lost 25lbs of weight.  He then started to force himself to walk around.  He has also had difficulty eating and drinking.  About 1 week ago, things started to get better 3-4 BM per day. He is on 30mg of prednisone.  No blood or mucus in the stool.  He has pasty stool. . Today on 2/4/2021, pt reports that he continues to have liquid stool, has up to 4-5 BM per day.  On prednisone 10mg, gained about 5lbs.  He got his iron infusion, which did give him more energy.    . SH: no smoke/occasional etoh;  FH: No IBD PMH: UC; hyperlipidemia; enlarged prostate . 10/2020: Colon, Random Biopsy: DIFFUSE CHRONIC ACTIVE COLITIS WITH ULCER AND GRANULATION TISSUE, CONSISTENT WITH ULCERATIVE COLITIS. See Comment. Negative for Infectious Organisms, Dysplasia or Malignancy. COMMENT: The biopsies show a diffuse and uniform chronic active colitis with a universal involvement of the rectum and absence of granulomas, supporting a diagnosis of ulcerative colitis. The presence of histological features of chronicity excludes acute infectious colitis. . 9/2019: A patchy area of mucosa in the terminal ileum was mildly nodular.  Biopsies were taken with a cold forceps for histology. Estimated blood loss was minimal. The remainder of the exam in the terminal ileum was normal. A patchy area of mild to moderate erythematous, granular and scarred mucosa was found in the rectum, in the recto- sigmoid colon, in the sigmoid colon, in the descending colon, at the splenic flexure, in the transverse colon, at the hepatic flexure, in the ascending colon and in the cecum.  Biopsies were taken with a cold forceps for histology. Estimated blood loss was minimal. The exam was otherwise normal throughout the examined colon. . A.	Terminal	I leum	,	Biopsy: -No	diagnostic	abnormality. -Normal	villous	architecture. -There	is	no	evidence	of	active	or	chronic	enteritis. -Negative	for	dysplasia	and	malignancy. B.	Random	Colon	,	Biopsy: -Chronic	active	colitis,	mild,	consistent	with	history	of	Ulcerative	Colitis. -No	granulomas,	negative	for	dysplasia	or	malignancy. . 10/2018: A.	Random	Colon	,	Biopsy: -Chronic	active	colitis,	mild,	consistent	with	history	of	Ulcerative	Colitis. -No	granulomas,	negative	for	dysplasia	or	malignancy. . 6/2017: A. TERMINAL ILEUM (BIOPSY), R/O COLITIS ILEAL MUCOSA WITH NO SIGNIFICANT HISTOPATHOLOGIC CHANGES. Negative for ileitis. B. CECUM (BIOPSY), R/O COLITIS COLONIC MUCOSA WITH NO SIGNIFICANT HISTOPATHOLOGIC CHANGES. Negative for crypt distortion, disarray, and significant inflammation. Negative for dysplasia. C. ASCENDING COLON (BIOPSY), R/O COLITIS COLONIC MUCOSA WITH NO SIGNIFICANT HISTOPATHOLOGIC CHANGES. Negative for crypt distortion, disarray, and significant inflammation. Negative for dysplasia. D. TRANSVERSE COLON (BIOPSY), R/O COLITIS COLONIC MUCOSA WITH NO SIGNIFICANT HISTOPATHOLOGIC CHANGES. Negative for crypt distortion, disarray, and significant inflammation. Negative for dysplasia. E. DESCENDING COLON (BIOPSY), R/O COLITIS COLONIC MUCOSA WITH NO SIGNIFICANT HISTOPATHOLOGIC CHANGES. Negative for crypt distortion, disarray, and significant inflammation. Negative for dysplasia. F. SIGMOID COLON (BIOPSY), R/O COLITIS COLONIC MUCOSA WITH FOCAL ACUTE HEMORRHAGE AND NO SIGNIFICANT HISTOPATHOLOGIC  CHANGES. Negative for crypt distortion, disarray, and significant inflammation. Negative for dysplasia. G. RECTUM (BIOPSY), R/O COLITIS QUIESCENT ULCERATIVE COLITIS WITH FOCAL LYMPHOID AGGREGATE. Negative for acute inflammation and dysplasia. . 6/2016: A. CECUM-BIOPSY MILD CHRONIC ULCERATIVE COLITIS WITH MINIMAL ACTIVITY.  NO EVIDENCE OF DYSPLASIA. B. ASCENDING COLON-BIOPSY CONSISTENT WITH QUIESCENT ULCERATIVE COLITIS, NO ACTIVE INFLAMMATION OR DYSPLASIA. C. TRANSVERSE COLON-BIOPSY CONSISTENT WITH QUIESCENT ULCERATIVE COLITIS, NO ACTIVE INFLAMMATION OR DYSPLASIA. D. DESCENDING COLON-BIOPSY MILD CHRONIC ULCERATIVE COLITIS WITH MINIMAL ACTIVITY.  NO EVIDENCE OF DYSPLASIA. E. SIGMOID COLON-BIOPSY MILD CHRONIC ULCERATIVE COLITIS WITH MINIMAL ACTIVITY.  NO EVIDENCE OF DYSPLASIA. F. RECTUM-BIOPSY MILD CHRONIC ULCERATIVE PROCTITIS WITH MINIMAL ACTIVITY.  NO EVIDENCE OF DYSPLASIA. .

## 2021-02-11 LAB
A/G RATIO: 1.6
ALBUMIN: 4.4
ALKALINE PHOSPHATASE: 77
ALT (SGPT): 9
AST (SGOT): 19
BASO (ABSOLUTE): 0.1
BASOS: 2
BILIRUBIN, TOTAL: 0.2
BUN/CREATININE RATIO: 14
BUN: 11
CALCIUM: 9.7
CARBON DIOXIDE, TOTAL: 29
CHLORIDE: 100
CREATININE: 0.79
EGFR IF AFRICN AM: 131
EGFR IF NONAFRICN AM: 113
EOS (ABSOLUTE): 0.5
EOS: 7
FERRITIN, SERUM: 171
GLOBULIN, TOTAL: 2.7
GLUCOSE: 69
HEMATOCRIT: 43.2
HEMATOLOGY COMMENTS:: (no result)
HEMOGLOBIN: 13.4
IMMATURE CELLS: (no result)
IMMATURE GRANS (ABS): 0
IMMATURE GRANULOCYTES: 0
LYMPHS (ABSOLUTE): 1.2
LYMPHS: 17
MCH: 26.8
MCHC: 31
MCV: 86
MONOCYTES(ABSOLUTE): 0.8
MONOCYTES: 10
NEUTROPHILS (ABSOLUTE): 4.7
NEUTROPHILS: 64
NRBC: (no result)
PLATELETS: 477
POTASSIUM: 4.4
PROTEIN, TOTAL: 7.1
RBC: 5
RDW: (no result)
SODIUM: 139
WBC: 7.3

## 2021-09-23 ENCOUNTER — LAB OUTSIDE AN ENCOUNTER (OUTPATIENT)
Dept: URBAN - METROPOLITAN AREA CLINIC 92 | Facility: CLINIC | Age: 41
End: 2021-09-23

## 2021-09-23 ENCOUNTER — TELEPHONE ENCOUNTER (OUTPATIENT)
Dept: URBAN - METROPOLITAN AREA CLINIC 92 | Facility: CLINIC | Age: 41
End: 2021-09-23

## 2021-09-23 RX ORDER — POLYETHYLENE GLYCOL 3350, SODIUM SULFATE, SODIUM CHLORIDE, POTASSIUM CHLORIDE, ASCORBIC ACID, SODIUM ASCORBATE 140-9-5.2G
1 KIT KIT ORAL ONCE
Qty: 1 | Refills: 1 | OUTPATIENT
Start: 2021-09-23 | End: 2021-09-25

## 2021-09-23 RX ORDER — PREDNISONE 10 MG/1
1 TABLET TABLET ORAL ONCE A DAY
Qty: 30 TABLET | Refills: 0

## 2021-09-29 ENCOUNTER — TELEPHONE ENCOUNTER (OUTPATIENT)
Dept: URBAN - METROPOLITAN AREA CLINIC 92 | Facility: CLINIC | Age: 41
End: 2021-09-29

## 2021-11-11 ENCOUNTER — OFFICE VISIT (OUTPATIENT)
Dept: URBAN - METROPOLITAN AREA SURGERY CENTER 18 | Facility: SURGERY CENTER | Age: 41
End: 2021-11-11
Payer: COMMERCIAL

## 2021-11-11 DIAGNOSIS — K51.00 ACUTE ULCERATIVE PANCOLITIS: ICD-10-CM

## 2021-11-11 PROCEDURE — 45380 COLONOSCOPY AND BIOPSY: CPT | Performed by: INTERNAL MEDICINE

## 2021-11-11 PROCEDURE — G8907 PT DOC NO EVENTS ON DISCHARG: HCPCS | Performed by: INTERNAL MEDICINE

## 2021-11-11 RX ORDER — PREDNISONE 10 MG/1
1 TABLET TABLET ORAL ONCE A DAY
Qty: 30 TABLET | Refills: 0 | Status: ACTIVE | COMMUNITY

## 2021-11-11 RX ORDER — BALSALAZIDE DISODIUM 750 MG/1
9 CAPSULES CAPSULE ORAL QD
Qty: 810 CAPSULE | Refills: 4 | Status: ACTIVE | COMMUNITY

## 2021-11-11 RX ORDER — HYOSCYAMINE SULFATE 0.12 MG/1
1 TABLET AS NEEDED TABLET ORAL
Qty: 30 | Refills: 1 | Status: ACTIVE | COMMUNITY

## 2021-11-11 RX ORDER — CHLORDIAZEPOXIDE HYDROCHLORIDE AND CLIDINIUM BROMIDE 5; 2.5 MG/1; MG/1
1 CAPSULE ORAL BID PRN
Qty: 20 | Refills: 0 | Status: ACTIVE | COMMUNITY
Start: 2020-10-02

## 2021-12-10 ENCOUNTER — OFFICE VISIT (OUTPATIENT)
Dept: URBAN - METROPOLITAN AREA TELEHEALTH 2 | Facility: TELEHEALTH | Age: 41
End: 2021-12-10

## 2021-12-13 ENCOUNTER — TELEPHONE ENCOUNTER (OUTPATIENT)
Dept: URBAN - METROPOLITAN AREA CLINIC 92 | Facility: CLINIC | Age: 41
End: 2021-12-13

## 2021-12-13 ENCOUNTER — OFFICE VISIT (OUTPATIENT)
Dept: URBAN - METROPOLITAN AREA TELEHEALTH 2 | Facility: TELEHEALTH | Age: 41
End: 2021-12-13
Payer: COMMERCIAL

## 2021-12-13 DIAGNOSIS — K51.00 UNIVERSAL ULCERATIVE COLITIS WITHOUT COMPLICATION: ICD-10-CM

## 2021-12-13 DIAGNOSIS — R19.7 DIARRHEA: ICD-10-CM

## 2021-12-13 DIAGNOSIS — R10.84 ABDOMINAL CRAMPING, GENERALIZED: ICD-10-CM

## 2021-12-13 DIAGNOSIS — D50.9 IRON DEFICIENCY ANEMIA: ICD-10-CM

## 2021-12-13 PROBLEM — 87522002 IRON DEFICIENCY ANEMIA: Status: ACTIVE | Noted: 2020-11-11

## 2021-12-13 PROCEDURE — 99215 OFFICE O/P EST HI 40 MIN: CPT | Performed by: INTERNAL MEDICINE

## 2021-12-13 RX ORDER — CHLORDIAZEPOXIDE HYDROCHLORIDE AND CLIDINIUM BROMIDE 5; 2.5 MG/1; MG/1
1 CAPSULE ORAL BID PRN
Qty: 20 | Refills: 0 | Status: ACTIVE | COMMUNITY
Start: 2020-10-02

## 2021-12-13 RX ORDER — BALSALAZIDE DISODIUM 750 MG/1
9 CAPSULES CAPSULE ORAL QD
Qty: 810 CAPSULE | Refills: 4 | Status: ACTIVE | COMMUNITY

## 2021-12-13 RX ORDER — PREDNISONE 10 MG/1
1 TABLET TABLET ORAL ONCE A DAY
Qty: 30 TABLET | Refills: 0 | Status: ACTIVE | COMMUNITY

## 2021-12-13 RX ORDER — PREDNISONE 10 MG/1
3 TABLET TABLET ORAL ONCE A DAY
Qty: 42 TABLET | Refills: 0 | OUTPATIENT

## 2021-12-13 RX ORDER — HYOSCYAMINE SULFATE 0.12 MG/1
1 TABLET AS NEEDED TABLET ORAL
Qty: 30 | Refills: 1 | Status: ACTIVE | COMMUNITY

## 2021-12-13 RX ORDER — BALSALAZIDE DISODIUM 750 MG/1
9 CAPSULES CAPSULE ORAL QD
Qty: 810 CAPSULE | Refills: 4 | OUTPATIENT

## 2021-12-13 NOTE — HPI-TODAY'S VISIT:
Pt Yobani is a 40 y/o indiv with pan-UC, currently on Balasalizde 9 tab, here for evaluation of refractory dz. . Pt is referred by Dr. Chen. . Pt was diagnosed with UC 1999.  He was started on azulfidine/sulfasalazine, developed hives on it.  Eventually he was started on another ASA.  Eventually he started on mesalamine.  He was hospitalized in 2020 for flares. . Previously on 10/26/2020, pt reports that he was started on prednisone 40mg.  He was recently hospitalized at [facility] for dehydration and abdominal pain.  He was found to have low electrolytes levels and given IV steroids and IV pain control.  He also has had a lot of rectal bleeding.  His Hgb is 8.8 currently (at [facility]).  He has up to 4 BM per day, with blood and mucus. . Previously 11/11/2020, pt reports that originally after hospital discharge, he was having severe pain and discomfort and lost 25lbs of weight.  He then started to force himself to walk around.  He has also had difficulty eating and drinking.  About 1 week ago, things started to get better 3-4 BM per day. He is on 30mg of prednisone.  No blood or mucus in the stool.  He has pasty stool. . Previously on 2/4/2021, pt reports that he continues to have liquid stool, has up to 4-5 BM per day.  On prednisone 10mg, gained about 5lbs.  He got his iron infusion, which did give him more energy.   . Today on 12/13/2021, pt reports that he misses his medication about 1 time per week.  Has sometimes solid stools.  Occasional blood in the stool.  He has lot of urgency of BM.  . SH: no smoke/occasional etoh;  FH: No IBD PMH: UC; hyperlipidemia; enlarged prostate . 11/2021: Discontinuous moderate inflammation characterized by erythema, friability and granularity was found in the ascending colon. Biopsies were taken with a cold forceps for histology. The pathology specimen was placed into Bottle Number 1. Findings: Diffuse moderate inflammation characterized by erosions, erythema and friability was found in the transverse colon. Biopsies were taken with a cold forceps for histology. The pathology specimen was placed into Bottle Number 2. Diffuse moderate inflammation characterized by congestion (edema), erosions, erythema and friability was found in the descending colon. Biopsies were taken with a cold forceps for histology. The pathology specimen was placed into Bottle Number 3. Diffuse moderate inflammation characterized by erosions and erythema was found in the sigmoid colon. Biopsies were taken with a cold forceps for histology. The pathology specimen was placed into Bottle Number 4. . A Ascending colon, biopsy Focal active colitis. No granulomas or dysplasia seen. B Transverse colon, biopsy Focal active colitis with architectural disarray. No granulomas or dysplasia seen. C Descending colon, biopsy Mild active colitis with architectural disarray. No granulomas or dysplasia seen. D Sigmoid colon, biopsy Focal active colitis with Paneth cell metaplasia. No granulomas or dysplasia seen. . 10/2020: Colon, Random Biopsy: DIFFUSE CHRONIC ACTIVE COLITIS WITH ULCER AND GRANULATION TISSUE, CONSISTENT WITH ULCERATIVE COLITIS. See Comment. Negative for Infectious Organisms, Dysplasia or Malignancy. COMMENT: The biopsies show a diffuse and uniform chronic active colitis with a universal involvement of the rectum and absence of granulomas, supporting a diagnosis of ulcerative colitis. The presence of histological features of chronicity excludes acute infectious colitis. . 9/2019: A patchy area of mucosa in the terminal ileum was mildly nodular.  Biopsies were taken with a cold forceps for histology. Estimated blood loss was minimal. The remainder of the exam in the terminal ileum was normal. A patchy area of mild to moderate erythematous, granular and scarred mucosa was found in the rectum, in the recto- sigmoid colon, in the sigmoid colon, in the descending colon, at the splenic flexure, in the transverse colon, at the hepatic flexure, in the ascending colon and in the cecum.  Biopsies were taken with a cold forceps for histology. Estimated blood loss was minimal. The exam was otherwise normal throughout the examined colon. . A.	Terminal	I leum	,	Biopsy: -No	diagnostic	abnormality. -Normal	villous	architecture. -There	is	no	evidence	of	active	or	chronic	enteritis. -Negative	for	dysplasia	and	malignancy. B.	Random	Colon	,	Biopsy: -Chronic	active	colitis,	mild,	consistent	with	history	of	Ulcerative	Colitis. -No	granulomas,	negative	for	dysplasia	or	malignancy. . 10/2018: A.	Random	Colon	,	Biopsy: -Chronic	active	colitis,	mild,	consistent	with	history	of	Ulcerative	Colitis. -No	granulomas,	negative	for	dysplasia	or	malignancy. . 6/2017: A. TERMINAL ILEUM (BIOPSY), R/O COLITIS ILEAL MUCOSA WITH NO SIGNIFICANT HISTOPATHOLOGIC CHANGES. Negative for ileitis. B. CECUM (BIOPSY), R/O COLITIS COLONIC MUCOSA WITH NO SIGNIFICANT HISTOPATHOLOGIC CHANGES. Negative for crypt distortion, disarray, and significant inflammation. Negative for dysplasia. C. ASCENDING COLON (BIOPSY), R/O COLITIS COLONIC MUCOSA WITH NO SIGNIFICANT HISTOPATHOLOGIC CHANGES. Negative for crypt distortion, disarray, and significant inflammation. Negative for dysplasia. D. TRANSVERSE COLON (BIOPSY), R/O COLITIS COLONIC MUCOSA WITH NO SIGNIFICANT HISTOPATHOLOGIC CHANGES. Negative for crypt distortion, disarray, and significant inflammation. Negative for dysplasia. E. DESCENDING COLON (BIOPSY), R/O COLITIS COLONIC MUCOSA WITH NO SIGNIFICANT HISTOPATHOLOGIC CHANGES. Negative for crypt distortion, disarray, and significant inflammation. Negative for dysplasia. F. SIGMOID COLON (BIOPSY), R/O COLITIS COLONIC MUCOSA WITH FOCAL ACUTE HEMORRHAGE AND NO SIGNIFICANT HISTOPATHOLOGIC  CHANGES. Negative for crypt distortion, disarray, and significant inflammation. Negative for dysplasia. G. RECTUM (BIOPSY), R/O COLITIS QUIESCENT ULCERATIVE COLITIS WITH FOCAL LYMPHOID AGGREGATE. Negative for acute inflammation and dysplasia. . 6/2016: A. CECUM-BIOPSY MILD CHRONIC ULCERATIVE COLITIS WITH MINIMAL ACTIVITY.  NO EVIDENCE OF DYSPLASIA. B. ASCENDING COLON-BIOPSY CONSISTENT WITH QUIESCENT ULCERATIVE COLITIS, NO ACTIVE INFLAMMATION OR DYSPLASIA. C. TRANSVERSE COLON-BIOPSY CONSISTENT WITH QUIESCENT ULCERATIVE COLITIS, NO ACTIVE INFLAMMATION OR DYSPLASIA. D. DESCENDING COLON-BIOPSY MILD CHRONIC ULCERATIVE COLITIS WITH MINIMAL ACTIVITY.  NO EVIDENCE OF DYSPLASIA. E. SIGMOID COLON-BIOPSY MILD CHRONIC ULCERATIVE COLITIS WITH MINIMAL ACTIVITY.  NO EVIDENCE OF DYSPLASIA. F. RECTUM-BIOPSY MILD CHRONIC ULCERATIVE PROCTITIS WITH MINIMAL ACTIVITY.  NO EVIDENCE OF DYSPLASIA. .

## 2021-12-13 NOTE — PHYSICAL EXAM EYES:
Conjuntivae and eyelids appear normal,  Sclerae : White without injection [No Acute Distress] : no acute distress [Well Nourished] : well nourished [Well Developed] : well developed [Well-Appearing] : well-appearing [Normal Sclera/Conjunctiva] : normal sclera/conjunctiva [PERRL] : pupils equal round and reactive to light [Normal Outer Ear/Nose] : the outer ears and nose were normal in appearance [EOMI] : extraocular movements intact [Normal Oropharynx] : the oropharynx was normal [No JVD] : no jugular venous distention [Supple] : supple [Thyroid Normal, No Nodules] : the thyroid was normal and there were no nodules present [No Lymphadenopathy] : no lymphadenopathy [No Respiratory Distress] : no respiratory distress  [Clear to Auscultation] : lungs were clear to auscultation bilaterally [Normal Rate] : normal rate  [No Accessory Muscle Use] : no accessory muscle use [Normal S1, S2] : normal S1 and S2 [Regular Rhythm] : with a regular rhythm [No Carotid Bruits] : no carotid bruits [No Murmur] : no murmur heard [No Abdominal Bruit] : a ~M bruit was not heard ~T in the abdomen [No Varicosities] : no varicosities [Pedal Pulses Present] : the pedal pulses are present [No Palpable Aorta] : no palpable aorta [No Extremity Clubbing/Cyanosis] : no extremity clubbing/cyanosis [No Edema] : there was no peripheral edema [Soft] : abdomen soft [Non Tender] : non-tender [Non-distended] : non-distended [No Masses] : no abdominal mass palpated [No HSM] : no HSM [Normal Bowel Sounds] : normal bowel sounds [Normal Posterior Cervical Nodes] : no posterior cervical lymphadenopathy [Normal Anterior Cervical Nodes] : no anterior cervical lymphadenopathy [No CVA Tenderness] : no CVA  tenderness [No Spinal Tenderness] : no spinal tenderness [No Joint Swelling] : no joint swelling [Grossly Normal Strength/Tone] : grossly normal strength/tone [No Rash] : no rash [Normal Gait] : normal gait [Coordination Grossly Intact] : coordination grossly intact [No Focal Deficits] : no focal deficits [Normal Affect] : the affect was normal [Deep Tendon Reflexes (DTR)] : deep tendon reflexes were 2+ and symmetric [Normal Insight/Judgement] : insight and judgment were intact

## 2021-12-22 LAB
A/G RATIO: 1.5
ALBUMIN: 4.8
ALKALINE PHOSPHATASE: 74
ALT (SGPT): 6
AST (SGOT): 16
BASO (ABSOLUTE): 0.1
BASOS: 1
BILIRUBIN, TOTAL: 0.4
BUN/CREATININE RATIO: 14
BUN: 13
C DIFFICILE TOXIN GENE NAA: POSITIVE
CALCIUM: 10.1
CALPROTECTIN, FECAL: 318
CARBON DIOXIDE, TOTAL: 24
CHLORIDE: 101
CREATININE: 0.9
EGFR IF AFRICN AM: 123
EGFR IF NONAFRICN AM: 106
EOS (ABSOLUTE): 0.2
EOS: 3
FERRITIN, SERUM: 8
GLOBULIN, TOTAL: 3.3
GLUCOSE: 87
HEMATOCRIT: 35.7
HEMATOLOGY COMMENTS:: (no result)
HEMOGLOBIN: 10.4
IMMATURE CELLS: (no result)
IMMATURE GRANS (ABS): 0
IMMATURE GRANULOCYTES: 0
IRON BIND.CAP.(TIBC): 542
IRON SATURATION: 3
IRON: 18
LYMPHS (ABSOLUTE): 1.4
LYMPHS: 20
MCH: 21.1
MCHC: 29.1
MCV: 72
MONOCYTES(ABSOLUTE): 0.7
MONOCYTES: 10
NEUTROPHILS (ABSOLUTE): 4.6
NEUTROPHILS: 66
NRBC: (no result)
PLATELETS: 617
POTASSIUM: 4.3
PROTEIN, TOTAL: 8.1
RBC: 4.93
RDW: 16.7
SODIUM: 139
UIBC: 524
VITAMIN B12: 681
VITAMIN D, 25-HYDROXY: 20.2
WBC: 6.9

## 2021-12-27 ENCOUNTER — TELEPHONE ENCOUNTER (OUTPATIENT)
Dept: URBAN - METROPOLITAN AREA CLINIC 92 | Facility: CLINIC | Age: 41
End: 2021-12-27

## 2021-12-27 RX ORDER — METRONIDAZOLE 500 MG/1
1 TABLET TABLET, FILM COATED ORAL QID
Qty: 56 TABLET | Refills: 1 | OUTPATIENT
Start: 2021-12-27 | End: 2022-01-24

## 2021-12-27 RX ORDER — VANCOMYCIN HYDROCHLORIDE 250 MG/1
2 CAPSULES CAPSULE ORAL
Qty: 112 CAPSULE | Refills: 1 | OUTPATIENT
Start: 2021-12-27 | End: 2022-01-24

## 2022-01-04 ENCOUNTER — TELEPHONE ENCOUNTER (OUTPATIENT)
Dept: URBAN - METROPOLITAN AREA CLINIC 92 | Facility: CLINIC | Age: 42
End: 2022-01-04

## 2022-01-04 RX ORDER — VANCOMYCIN HYDROCHLORIDE 250 MG/1
2 CAPSULES CAPSULE ORAL
Qty: 112 CAPSULE | Refills: 1
Start: 2021-12-27 | End: 2022-02-03

## 2022-01-04 RX ORDER — METRONIDAZOLE 500 MG/1
1 TABLET TABLET, FILM COATED ORAL QID
Qty: 56 TABLET | Refills: 1
Start: 2021-12-27 | End: 2022-02-03

## 2022-01-19 ENCOUNTER — WEB ENCOUNTER (OUTPATIENT)
Dept: URBAN - METROPOLITAN AREA CLINIC 98 | Facility: CLINIC | Age: 42
End: 2022-01-19

## 2023-02-15 ENCOUNTER — WEB ENCOUNTER (OUTPATIENT)
Dept: URBAN - METROPOLITAN AREA CLINIC 98 | Facility: CLINIC | Age: 43
End: 2023-02-15

## 2023-02-15 RX ORDER — BALSALAZIDE DISODIUM 750 MG/1
9 CAPSULES CAPSULE ORAL QD
Qty: 810 CAPSULE | Refills: 4
End: 2024-05-11

## 2023-03-31 ENCOUNTER — TELEPHONE ENCOUNTER (OUTPATIENT)
Dept: URBAN - METROPOLITAN AREA CLINIC 35 | Facility: CLINIC | Age: 43
End: 2023-03-31

## 2023-03-31 ENCOUNTER — LAB OUTSIDE AN ENCOUNTER (OUTPATIENT)
Dept: URBAN - METROPOLITAN AREA CLINIC 96 | Facility: CLINIC | Age: 43
End: 2023-03-31

## 2023-03-31 ENCOUNTER — OFFICE VISIT (OUTPATIENT)
Dept: URBAN - METROPOLITAN AREA CLINIC 96 | Facility: CLINIC | Age: 43
End: 2023-03-31
Payer: COMMERCIAL

## 2023-03-31 DIAGNOSIS — R10.9 ABDOMINAL PAIN: ICD-10-CM

## 2023-03-31 DIAGNOSIS — R19.7 DIARRHEA: ICD-10-CM

## 2023-03-31 DIAGNOSIS — D50.9 IRON DEFICIENCY ANEMIA: ICD-10-CM

## 2023-03-31 DIAGNOSIS — R19.8 TENESMUS (RECTAL): ICD-10-CM

## 2023-03-31 DIAGNOSIS — Z09 FOLLOW UP: ICD-10-CM

## 2023-03-31 DIAGNOSIS — Z71.89 VACCINE COUNSELING: ICD-10-CM

## 2023-03-31 DIAGNOSIS — K51.00 UNIVERSAL ULCERATIVE COLITIS WITHOUT COMPLICATION: ICD-10-CM

## 2023-03-31 PROCEDURE — 99214 OFFICE O/P EST MOD 30 MIN: CPT | Performed by: INTERNAL MEDICINE

## 2023-03-31 RX ORDER — PREDNISONE 10 MG/1
3 TABLET TABLET ORAL ONCE A DAY
Qty: 42 TABLET | Refills: 0 | Status: ACTIVE | COMMUNITY

## 2023-03-31 RX ORDER — HYOSCYAMINE SULFATE 0.12 MG/1
1 TABLET AS NEEDED TABLET ORAL
Qty: 30 | Refills: 1 | Status: ACTIVE | COMMUNITY

## 2023-03-31 RX ORDER — BALSALAZIDE DISODIUM 750 MG/1
9 CAPSULES CAPSULE ORAL QD
Qty: 810 CAPSULE | Refills: 4 | Status: ACTIVE | COMMUNITY
End: 2024-05-11

## 2023-03-31 RX ORDER — CHLORDIAZEPOXIDE HYDROCHLORIDE AND CLIDINIUM BROMIDE 5; 2.5 MG/1; MG/1
1 CAPSULE ORAL BID PRN
Qty: 20 | Refills: 0 | Status: ACTIVE | COMMUNITY
Start: 2020-10-02

## 2023-03-31 RX ORDER — BALSALAZIDE DISODIUM 750 MG/1
9 CAPSULES CAPSULE ORAL DAILY
Qty: 810 CAPSULE | Refills: 4 | OUTPATIENT

## 2023-03-31 NOTE — HPI-TODAY'S VISIT:
Pt Yobani is a 44 y/o indiv with pan-UC, currently on Balasalizde 9 tab, here for evaluation of refractory dz. . Pt is referred by Dr. Chen. . Pt was diagnosed with UC 1999.  He was started on azulfidine/sulfasalazine, developed hives on it.  Eventually he was started on another ASA.  Eventually he started on mesalamine.  He was hospitalized in 2020 for flares. . Previously on 10/26/2020, pt reports that he was started on prednisone 40mg.  He was recently hospitalized at [facility] for dehydration and abdominal pain.  He was found to have low electrolytes levels and given IV steroids and IV pain control.  He also has had a lot of rectal bleeding.  His Hgb is 8.8 currently (at [facility]).  He has up to 4 BM per day, with blood and mucus. . Previously 11/11/2020, pt reports that originally after hospital discharge, he was having severe pain and discomfort and lost 25lbs of weight.  He then started to force himself to walk around.  He has also had difficulty eating and drinking.  About 1 week ago, things started to get better 3-4 BM per day. He is on 30mg of prednisone.  No blood or mucus in the stool.  He has pasty stool. . Previously on 2/4/2021, pt reports that he continues to have liquid stool, has up to 4-5 BM per day.  On prednisone 10mg, gained about 5lbs.  He got his iron infusion, which did give him more energy.   . Previously on 12/13/2021, pt reports that he misses his medication about 1 time per week.  Has sometimes solid stools.  Occasional blood in the stool.  He has lot of urgency of BM.  . Today on 3/31/2023, pt reports that he is doing well overall.  He has noticed that his stool is little loose.  No urgency or blood.   . SH: no smoke/occasional etoh;  FH: No IBD PMH: UC; hyperlipidemia; enlarged prostate . 11/2021: Discontinuous moderate inflammation characterized by erythema, friability and granularity was found in the ascending colon. Biopsies were taken with a cold forceps for histology. The pathology specimen was placed into Bottle Number 1. Findings: Diffuse moderate inflammation characterized by erosions, erythema and friability was found in the transverse colon. Biopsies were taken with a cold forceps for histology. The pathology specimen was placed into Bottle Number 2. Diffuse moderate inflammation characterized by congestion (edema), erosions, erythema and friability was found in the descending colon. Biopsies were taken with a cold forceps for histology. The pathology specimen was placed into Bottle Number 3. Diffuse moderate inflammation characterized by erosions and erythema was found in the sigmoid colon. Biopsies were taken with a cold forceps for histology. The pathology specimen was placed into Bottle Number 4. . A Ascending colon, biopsy Focal active colitis. No granulomas or dysplasia seen. B Transverse colon, biopsy Focal active colitis with architectural disarray. No granulomas or dysplasia seen. C Descending colon, biopsy Mild active colitis with architectural disarray. No granulomas or dysplasia seen. D Sigmoid colon, biopsy Focal active colitis with Paneth cell metaplasia. No granulomas or dysplasia seen. . 10/2020: Colon, Random Biopsy: DIFFUSE CHRONIC ACTIVE COLITIS WITH ULCER AND GRANULATION TISSUE, CONSISTENT WITH ULCERATIVE COLITIS. See Comment. Negative for Infectious Organisms, Dysplasia or Malignancy. COMMENT: The biopsies show a diffuse and uniform chronic active colitis with a universal involvement of the rectum and absence of granulomas, supporting a diagnosis of ulcerative colitis. The presence of histological features of chronicity excludes acute infectious colitis. . 9/2019: A patchy area of mucosa in the terminal ileum was mildly nodular.  Biopsies were taken with a cold forceps for histology. Estimated blood loss was minimal. The remainder of the exam in the terminal ileum was normal. A patchy area of mild to moderate erythematous, granular and scarred mucosa was found in the rectum, in the recto- sigmoid colon, in the sigmoid colon, in the descending colon, at the splenic flexure, in the transverse colon, at the hepatic flexure, in the ascending colon and in the cecum.  Biopsies were taken with a cold forceps for histology. Estimated blood loss was minimal. The exam was otherwise normal throughout the examined colon. . A.	Terminal	I leum	,	Biopsy: -No	diagnostic	abnormality. -Normal	villous	architecture. -There	is	no	evidence	of	active	or	chronic	enteritis. -Negative	for	dysplasia	and	malignancy. B.	Random	Colon	,	Biopsy: -Chronic	active	colitis,	mild,	consistent	with	history	of	Ulcerative	Colitis. -No	granulomas,	negative	for	dysplasia	or	malignancy. . 10/2018: A.	Random	Colon	,	Biopsy: -Chronic	active	colitis,	mild,	consistent	with	history	of	Ulcerative	Colitis. -No	granulomas,	negative	for	dysplasia	or	malignancy. . 6/2017: A. TERMINAL ILEUM (BIOPSY), R/O COLITIS ILEAL MUCOSA WITH NO SIGNIFICANT HISTOPATHOLOGIC CHANGES. Negative for ileitis. B. CECUM (BIOPSY), R/O COLITIS COLONIC MUCOSA WITH NO SIGNIFICANT HISTOPATHOLOGIC CHANGES. Negative for crypt distortion, disarray, and significant inflammation. Negative for dysplasia. C. ASCENDING COLON (BIOPSY), R/O COLITIS COLONIC MUCOSA WITH NO SIGNIFICANT HISTOPATHOLOGIC CHANGES. Negative for crypt distortion, disarray, and significant inflammation. Negative for dysplasia. D. TRANSVERSE COLON (BIOPSY), R/O COLITIS COLONIC MUCOSA WITH NO SIGNIFICANT HISTOPATHOLOGIC CHANGES. Negative for crypt distortion, disarray, and significant inflammation. Negative for dysplasia. E. DESCENDING COLON (BIOPSY), R/O COLITIS COLONIC MUCOSA WITH NO SIGNIFICANT HISTOPATHOLOGIC CHANGES. Negative for crypt distortion, disarray, and significant inflammation. Negative for dysplasia. F. SIGMOID COLON (BIOPSY), R/O COLITIS COLONIC MUCOSA WITH FOCAL ACUTE HEMORRHAGE AND NO SIGNIFICANT HISTOPATHOLOGIC  CHANGES. Negative for crypt distortion, disarray, and significant inflammation. Negative for dysplasia. G. RECTUM (BIOPSY), R/O COLITIS QUIESCENT ULCERATIVE COLITIS WITH FOCAL LYMPHOID AGGREGATE. Negative for acute inflammation and dysplasia. . 6/2016: A. CECUM-BIOPSY MILD CHRONIC ULCERATIVE COLITIS WITH MINIMAL ACTIVITY.  NO EVIDENCE OF DYSPLASIA. B. ASCENDING COLON-BIOPSY CONSISTENT WITH QUIESCENT ULCERATIVE COLITIS, NO ACTIVE INFLAMMATION OR DYSPLASIA. C. TRANSVERSE COLON-BIOPSY CONSISTENT WITH QUIESCENT ULCERATIVE COLITIS, NO ACTIVE INFLAMMATION OR DYSPLASIA. D. DESCENDING COLON-BIOPSY MILD CHRONIC ULCERATIVE COLITIS WITH MINIMAL ACTIVITY.  NO EVIDENCE OF DYSPLASIA. E. SIGMOID COLON-BIOPSY MILD CHRONIC ULCERATIVE COLITIS WITH MINIMAL ACTIVITY.  NO EVIDENCE OF DYSPLASIA. F. RECTUM-BIOPSY MILD CHRONIC ULCERATIVE PROCTITIS WITH MINIMAL ACTIVITY.  NO EVIDENCE OF DYSPLASIA.

## 2023-06-21 ENCOUNTER — WEB ENCOUNTER (OUTPATIENT)
Dept: URBAN - METROPOLITAN AREA CLINIC 98 | Facility: CLINIC | Age: 43
End: 2023-06-21

## 2023-07-28 ENCOUNTER — OFFICE VISIT (OUTPATIENT)
Dept: URBAN - METROPOLITAN AREA SURGERY CENTER 18 | Facility: SURGERY CENTER | Age: 43
End: 2023-07-28

## 2023-10-13 ENCOUNTER — OUT OF OFFICE VISIT (OUTPATIENT)
Dept: URBAN - METROPOLITAN AREA SURGERY CENTER 18 | Facility: SURGERY CENTER | Age: 43
End: 2023-10-13
Payer: COMMERCIAL

## 2023-10-13 DIAGNOSIS — K63.89 OTHER SPECIFIED DISEASES OF INTESTINE: ICD-10-CM

## 2023-10-13 DIAGNOSIS — K62.89 OTHER SPECIFIED DISEASES OF ANUS AND RECTUM: ICD-10-CM

## 2023-10-13 DIAGNOSIS — K62.9 MUCOSAL ABNORMALITY OF RECTUM: ICD-10-CM

## 2023-10-13 DIAGNOSIS — K63.5 BENIGN COLON POLYP: ICD-10-CM

## 2023-10-13 DIAGNOSIS — K52.89 OTHER SPECIFIED NONINFECTIVE GASTROENTERITIS AND COLITIS: ICD-10-CM

## 2023-10-13 DIAGNOSIS — Z12.11 COLON CANCER SCREENING (HIGH RISK): ICD-10-CM

## 2023-10-13 DIAGNOSIS — D12.4 ADENOMATOUS POLYP OF DESCENDING COLON: ICD-10-CM

## 2023-10-13 DIAGNOSIS — K63.9 MUCOSAL ABNORMALITY OF COLON: ICD-10-CM

## 2023-10-13 PROCEDURE — 45385 COLONOSCOPY W/LESION REMOVAL: CPT | Performed by: INTERNAL MEDICINE

## 2023-10-13 PROCEDURE — 45380 COLONOSCOPY AND BIOPSY: CPT | Performed by: INTERNAL MEDICINE

## 2023-10-13 PROCEDURE — 00811 ANES LWR INTST NDSC NOS: CPT | Performed by: ANESTHESIOLOGY

## 2023-10-13 PROCEDURE — G8907 PT DOC NO EVENTS ON DISCHARG: HCPCS | Performed by: INTERNAL MEDICINE

## 2023-10-13 RX ORDER — PREDNISONE 10 MG/1
3 TABLET TABLET ORAL ONCE A DAY
Qty: 42 TABLET | Refills: 0 | Status: ACTIVE | COMMUNITY

## 2023-10-13 RX ORDER — CHLORDIAZEPOXIDE HYDROCHLORIDE AND CLIDINIUM BROMIDE 5; 2.5 MG/1; MG/1
1 CAPSULE ORAL BID PRN
Qty: 20 | Refills: 0 | Status: ACTIVE | COMMUNITY
Start: 2020-10-02

## 2023-10-13 RX ORDER — BALSALAZIDE DISODIUM 750 MG/1
9 CAPSULES CAPSULE ORAL DAILY
Qty: 810 CAPSULE | Refills: 4 | Status: ACTIVE | COMMUNITY

## 2023-10-13 RX ORDER — HYOSCYAMINE SULFATE 0.12 MG/1
1 TABLET AS NEEDED TABLET ORAL
Qty: 30 | Refills: 1 | Status: ACTIVE | COMMUNITY

## 2023-11-28 ENCOUNTER — WEB ENCOUNTER (OUTPATIENT)
Dept: URBAN - METROPOLITAN AREA CLINIC 98 | Facility: CLINIC | Age: 43
End: 2023-11-28

## 2023-11-28 RX ORDER — PREDNISONE 10 MG/1
1 TABLET TABLET ORAL ONCE A DAY
Qty: 30 TABLET | Refills: 0

## 2024-01-18 ENCOUNTER — LAB OUTSIDE AN ENCOUNTER (OUTPATIENT)
Dept: URBAN - METROPOLITAN AREA TELEHEALTH 2 | Facility: TELEHEALTH | Age: 44
End: 2024-01-18

## 2024-01-18 ENCOUNTER — OFFICE VISIT (OUTPATIENT)
Dept: URBAN - METROPOLITAN AREA TELEHEALTH 2 | Facility: TELEHEALTH | Age: 44
End: 2024-01-18
Payer: COMMERCIAL

## 2024-01-18 ENCOUNTER — DASHBOARD ENCOUNTERS (OUTPATIENT)
Age: 44
End: 2024-01-18

## 2024-01-18 DIAGNOSIS — D50.8 ACHLORHYDRIC ANEMIA: ICD-10-CM

## 2024-01-18 DIAGNOSIS — Z09 FOLLOW UP: ICD-10-CM

## 2024-01-18 DIAGNOSIS — R19.7 DIARRHEA: ICD-10-CM

## 2024-01-18 DIAGNOSIS — K51.00 UNIVERSAL ULCERATIVE COLITIS WITHOUT COMPLICATION: ICD-10-CM

## 2024-01-18 PROCEDURE — 99213 OFFICE O/P EST LOW 20 MIN: CPT | Performed by: INTERNAL MEDICINE

## 2024-01-18 RX ORDER — PREDNISONE 10 MG/1
1 TABLET TABLET ORAL ONCE A DAY
Qty: 30 TABLET | Refills: 0 | OUTPATIENT
Start: 2024-01-18 | End: 2024-02-17

## 2024-01-18 RX ORDER — CHLORDIAZEPOXIDE HYDROCHLORIDE AND CLIDINIUM BROMIDE 5; 2.5 MG/1; MG/1
1 CAPSULE ORAL BID PRN
Qty: 20 | Refills: 0 | Status: ACTIVE | COMMUNITY
Start: 2020-10-02

## 2024-01-18 RX ORDER — BALSALAZIDE DISODIUM 750 MG/1
9 CAPSULES CAPSULE ORAL DAILY
Qty: 810 CAPSULE | Refills: 4 | OUTPATIENT

## 2024-01-18 RX ORDER — HYOSCYAMINE SULFATE 0.12 MG/1
1 TABLET AS NEEDED TABLET ORAL
Qty: 30 | Refills: 1 | Status: ACTIVE | COMMUNITY

## 2024-01-18 RX ORDER — PREDNISONE 10 MG/1
1 TABLET TABLET ORAL ONCE A DAY
Qty: 30 TABLET | Refills: 0 | Status: DISCONTINUED | COMMUNITY

## 2024-01-18 RX ORDER — COLESEVELAM HYDROCHLORIDE 625 MG/1
3 TABLETS WITH MEALS TABLET, FILM COATED ORAL EVERY 8 HOURS
Qty: 270 TABLET | Refills: 11 | OUTPATIENT
Start: 2024-01-18

## 2024-01-18 RX ORDER — BALSALAZIDE DISODIUM 750 MG/1
9 CAPSULES CAPSULE ORAL DAILY
Qty: 810 CAPSULE | Refills: 4 | Status: ACTIVE | COMMUNITY

## 2024-01-18 NOTE — HPI-TODAY'S VISIT:
Pt Yobani is a 44 y/o indiv with pan-UC, currently on Balasalizde 9 tab, here for evaluation of refractory dz. . Pt is referred by Dr. Chen. . Pt was diagnosed with UC 1999.  He was started on azulfidine/sulfasalazine, developed hives on it.  Eventually he was started on another ASA.  Eventually he started on mesalamine.  He was hospitalized in 2020 for flares. . Previously on 10/26/2020, pt reports that he was started on prednisone 40mg.  He was recently hospitalized at [facility] for dehydration and abdominal pain.  He was found to have low electrolytes levels and given IV steroids and IV pain control.  He also has had a lot of rectal bleeding.  His Hgb is 8.8 currently (at [facility]).  He has up to 4 BM per day, with blood and mucus. . Previously 11/11/2020, pt reports that originally after hospital discharge, he was having severe pain and discomfort and lost 25lbs of weight.  He then started to force himself to walk around.  He has also had difficulty eating and drinking.  About 1 week ago, things started to get better 3-4 BM per day. He is on 30mg of prednisone.  No blood or mucus in the stool.  He has pasty stool. . Previously on 2/4/2021, pt reports that he continues to have liquid stool, has up to 4-5 BM per day.  On prednisone 10mg, gained about 5lbs.  He got his iron infusion, which did give him more energy.   . Previously on 12/13/2021, pt reports that he misses his medication about 1 time per week.  Has sometimes solid stools.  Occasional blood in the stool.  He has lot of urgency of BM.  . Previously on 3/31/2023, pt reports that he is doing well overall.  He has noticed that his stool is little loose.  No urgency or blood.   . Today on 1/18/2024, pt reports that he has 3-4 BM per day, loose/oatmeal consistency, no blood and no pain.  No GERD . SH: no smoke/occasional etoh;  FH: No IBD PMH: UC; hyperlipidemia; enlarged prostate . 10/2023: A Ascending colon, biopsyColonic mucosa with no significant histopathology. No histologic evidence of active colitis, granulomata or dysplasia.B Transverse colon, biopsyColonic mucosa with no significant histopathology. No histologic evidence of active colitis, granulomata or dysplasia.C Descending colon, polypectomyHyperplastic colonic mucosa. No dysplasia seen.D Descending colon, biopsyFocal active colitis. No granulomas or dysplasia seen.ESigmoid colon, biopsyColonic mucosa with no significant histopathology. No histologic evidence of active colitis, granulomata or dysplasia.FRectum, biopsyColonic mucosa with no significant histopathology. No histologic evidence of active colitis, granulomata or dysplasia.. . 11/2021: Discontinuous moderate inflammation characterized by erythema, friability and granularity was found in the ascending colon. Biopsies were taken with a cold forceps for histology. The pathology specimen was placed into Bottle Number 1. Findings: Diffuse moderate inflammation characterized by erosions, erythema and friability was found in the transverse colon. Biopsies were taken with a cold forceps for histology. The pathology specimen was placed into Bottle Number 2. Diffuse moderate inflammation characterized by congestion (edema), erosions, erythema and friability was found in the descending colon. Biopsies were taken with a cold forceps for histology. The pathology specimen was placed into Bottle Number 3. Diffuse moderate inflammation characterized by erosions and erythema was found in the sigmoid colon. Biopsies were taken with a cold forceps for histology. The pathology specimen was placed into Bottle Number 4. . A Ascending colon, biopsy Focal active colitis. No granulomas or dysplasia seen. B Transverse colon, biopsy Focal active colitis with architectural disarray. No granulomas or dysplasia seen. C Descending colon, biopsy Mild active colitis with architectural disarray. No granulomas or dysplasia seen. D Sigmoid colon, biopsy Focal active colitis with Paneth cell metaplasia. No granulomas or dysplasia seen. . 10/2020: Colon, Random Biopsy: DIFFUSE CHRONIC ACTIVE COLITIS WITH ULCER AND GRANULATION TISSUE, CONSISTENT WITH ULCERATIVE COLITIS. See Comment. Negative for Infectious Organisms, Dysplasia or Malignancy. COMMENT: The biopsies show a diffuse and uniform chronic active colitis with a universal involvement of the rectum and absence of granulomas, supporting a diagnosis of ulcerative colitis. The presence of histological features of chronicity excludes acute infectious colitis. . 9/2019: A patchy area of mucosa in the terminal ileum was mildly nodular.  Biopsies were taken with a cold forceps for histology. Estimated blood loss was minimal. The remainder of the exam in the terminal ileum was normal. A patchy area of mild to moderate erythematous, granular and scarred mucosa was found in the rectum, in the recto- sigmoid colon, in the sigmoid colon, in the descending colon, at the splenic flexure, in the transverse colon, at the hepatic flexure, in the ascending colon and in the cecum.  Biopsies were taken with a cold forceps for histology. Estimated blood loss was minimal. The exam was otherwise normal throughout the examined colon. . A.	Terminal	I leum	,	Biopsy: -No	diagnostic	abnormality. -Normal	villous	architecture. -There	is	no	evidence	of	active	or	chronic	enteritis. -Negative	for	dysplasia	and	malignancy. B.	Random	Colon	,	Biopsy: -Chronic	active	colitis,	mild,	consistent	with	history	of	Ulcerative	Colitis. -No	granulomas,	negative	for	dysplasia	or	malignancy. . 10/2018: A.	Random	Colon	,	Biopsy: -Chronic	active	colitis,	mild,	consistent	with	history	of	Ulcerative	Colitis. -No	granulomas,	negative	for	dysplasia	or	malignancy. . 6/2017: A. TERMINAL ILEUM (BIOPSY), R/O COLITIS ILEAL MUCOSA WITH NO SIGNIFICANT HISTOPATHOLOGIC CHANGES. Negative for ileitis. B. CECUM (BIOPSY), R/O COLITIS COLONIC MUCOSA WITH NO SIGNIFICANT HISTOPATHOLOGIC CHANGES. Negative for crypt distortion, disarray, and significant inflammation. Negative for dysplasia. C. ASCENDING COLON (BIOPSY), R/O COLITIS COLONIC MUCOSA WITH NO SIGNIFICANT HISTOPATHOLOGIC CHANGES. Negative for crypt distortion, disarray, and significant inflammation. Negative for dysplasia. D. TRANSVERSE COLON (BIOPSY), R/O COLITIS COLONIC MUCOSA WITH NO SIGNIFICANT HISTOPATHOLOGIC CHANGES. Negative for crypt distortion, disarray, and significant inflammation. Negative for dysplasia. E. DESCENDING COLON (BIOPSY), R/O COLITIS COLONIC MUCOSA WITH NO SIGNIFICANT HISTOPATHOLOGIC CHANGES. Negative for crypt distortion, disarray, and significant inflammation. Negative for dysplasia. F. SIGMOID COLON (BIOPSY), R/O COLITIS COLONIC MUCOSA WITH FOCAL ACUTE HEMORRHAGE AND NO SIGNIFICANT HISTOPATHOLOGIC  CHANGES. Negative for crypt distortion, disarray, and significant inflammation. Negative for dysplasia. G. RECTUM (BIOPSY), R/O COLITIS QUIESCENT ULCERATIVE COLITIS WITH FOCAL LYMPHOID AGGREGATE. Negative for acute inflammation and dysplasia. . 6/2016: A. CECUM-BIOPSY MILD CHRONIC ULCERATIVE COLITIS WITH MINIMAL ACTIVITY.  NO EVIDENCE OF DYSPLASIA. B. ASCENDING COLON-BIOPSY CONSISTENT WITH QUIESCENT ULCERATIVE COLITIS, NO ACTIVE INFLAMMATION OR DYSPLASIA. C. TRANSVERSE COLON-BIOPSY CONSISTENT WITH QUIESCENT ULCERATIVE COLITIS, NO ACTIVE INFLAMMATION OR DYSPLASIA. D. DESCENDING COLON-BIOPSY MILD CHRONIC ULCERATIVE COLITIS WITH MINIMAL ACTIVITY.  NO EVIDENCE OF DYSPLASIA. E. SIGMOID COLON-BIOPSY MILD CHRONIC ULCERATIVE COLITIS WITH MINIMAL ACTIVITY.  NO EVIDENCE OF DYSPLASIA. F. RECTUM-BIOPSY MILD CHRONIC ULCERATIVE PROCTITIS WITH MINIMAL ACTIVITY.  NO EVIDENCE OF DYSPLASIA.

## 2024-02-01 LAB
A/G RATIO: 1.4
ALBUMIN: 4.7
ALKALINE PHOSPHATASE: 77
ALT (SGPT): 10
AST (SGOT): 22
BASO (ABSOLUTE): 0.1
BASOS: 1
BILIRUBIN, TOTAL: 0.4
BUN/CREATININE RATIO: 14
BUN: 13
CALCIUM: 9.9
CARBON DIOXIDE, TOTAL: 25
CHLORIDE: 100
CREATININE: 0.96
EGFR: 101
EOS (ABSOLUTE): 0.4
EOS: 6
FERRITIN, SERUM: 51
GLOBULIN, TOTAL: 3.4
GLUCOSE: 90
HEMATOCRIT: 48.2
HEMATOLOGY COMMENTS:: (no result)
HEMOGLOBIN: 16.2
IMMATURE CELLS: (no result)
IMMATURE GRANS (ABS): 0
IMMATURE GRANULOCYTES: 0
IRON BIND.CAP.(TIBC): 394
IRON SATURATION: 25
IRON: 100
LYMPHS (ABSOLUTE): 1.7
LYMPHS: 26
MCH: 30.3
MCHC: 33.6
MCV: 90
MONOCYTES(ABSOLUTE): 0.5
MONOCYTES: 8
NEUTROPHILS (ABSOLUTE): 3.8
NEUTROPHILS: 59
NRBC: (no result)
PLATELETS: 365
POTASSIUM: 4.5
PROTEIN, TOTAL: 8.1
RBC: 5.34
RDW: 13.4
SODIUM: 141
UIBC: 294
VITAMIN B12: 682
VITAMIN D, 25-HYDROXY: 11.2
WBC: 6.5

## 2024-07-18 ENCOUNTER — TELEPHONE ENCOUNTER (OUTPATIENT)
Dept: URBAN - METROPOLITAN AREA CLINIC 96 | Facility: CLINIC | Age: 44
End: 2024-07-18

## 2024-07-18 RX ORDER — BALSALAZIDE DISODIUM 750 MG/1
9 CAPSULES CAPSULE ORAL DAILY
Qty: 810 CAPSULE | Refills: 4
End: 2025-10-11

## 2024-07-18 RX ORDER — PREDNISONE 10 MG/1
1 TABLET TABLET ORAL ONCE A DAY
Qty: 30 TABLET | Refills: 0
Start: 2024-01-18 | End: 2024-08-17

## 2024-08-02 NOTE — PHYSICAL EXAM NECK/THYROID:
normal appearance , without tenderness upon palpation , no deformities , trachea midline , Thyroid normal size , no masses , thyroid nontender ED

## 2025-03-03 ENCOUNTER — WEB ENCOUNTER (OUTPATIENT)
Dept: URBAN - METROPOLITAN AREA CLINIC 98 | Facility: CLINIC | Age: 45
End: 2025-03-03

## 2025-03-05 ENCOUNTER — TELEPHONE ENCOUNTER (OUTPATIENT)
Dept: URBAN - METROPOLITAN AREA CLINIC 96 | Facility: CLINIC | Age: 45
End: 2025-03-05

## 2025-03-05 RX ORDER — BALSALAZIDE DISODIUM 750 MG/1
9 CAPSULES CAPSULE ORAL DAILY
Qty: 810 CAPSULE | Refills: 1

## 2025-04-24 ENCOUNTER — WEB ENCOUNTER (OUTPATIENT)
Dept: URBAN - METROPOLITAN AREA CLINIC 98 | Facility: CLINIC | Age: 45
End: 2025-04-24

## 2025-04-24 RX ORDER — BALSALAZIDE DISODIUM 750 MG/1
9 CAPSULES CAPSULE ORAL DAILY
Qty: 810 CAPSULE | Refills: 1

## 2025-04-25 ENCOUNTER — WEB ENCOUNTER (OUTPATIENT)
Dept: URBAN - METROPOLITAN AREA CLINIC 98 | Facility: CLINIC | Age: 45
End: 2025-04-25

## 2025-04-25 RX ORDER — BALSALAZIDE DISODIUM 750 MG/1
9 CAPSULES CAPSULE ORAL DAILY
Qty: 810 CAPSULE | Refills: 1

## 2025-04-25 RX ORDER — PREDNISONE 10 MG/1
2 TABLETS TABLET ORAL ONCE A DAY
Qty: 60 | Refills: 0 | OUTPATIENT
Start: 2025-04-28

## 2025-05-02 ENCOUNTER — TELEPHONE ENCOUNTER (OUTPATIENT)
Dept: URBAN - METROPOLITAN AREA CLINIC 96 | Facility: CLINIC | Age: 45
End: 2025-05-02

## 2025-05-02 RX ORDER — PREDNISONE 10 MG/1
2 TABLETS TABLET ORAL ONCE A DAY
Qty: 60 | Refills: 0
Start: 2025-04-28

## 2025-05-22 ENCOUNTER — OFFICE VISIT (OUTPATIENT)
Dept: URBAN - METROPOLITAN AREA TELEHEALTH 2 | Facility: TELEHEALTH | Age: 45
End: 2025-05-22
Payer: COMMERCIAL

## 2025-05-22 ENCOUNTER — LAB OUTSIDE AN ENCOUNTER (OUTPATIENT)
Dept: URBAN - METROPOLITAN AREA TELEHEALTH 2 | Facility: TELEHEALTH | Age: 45
End: 2025-05-22

## 2025-05-22 ENCOUNTER — TELEPHONE ENCOUNTER (OUTPATIENT)
Dept: URBAN - METROPOLITAN AREA CLINIC 96 | Facility: CLINIC | Age: 45
End: 2025-05-22

## 2025-05-22 DIAGNOSIS — D50.9 IRON DEFICIENCY ANEMIA: ICD-10-CM

## 2025-05-22 DIAGNOSIS — R19.7 DIARRHEA: ICD-10-CM

## 2025-05-22 DIAGNOSIS — K51.00 UNIVERSAL ULCERATIVE COLITIS WITHOUT COMPLICATION: ICD-10-CM

## 2025-05-22 PROCEDURE — 99213 OFFICE O/P EST LOW 20 MIN: CPT | Performed by: INTERNAL MEDICINE

## 2025-05-22 RX ORDER — COLESEVELAM HYDROCHLORIDE 625 MG/1
3 TABLETS WITH MEALS TABLET, FILM COATED ORAL EVERY 8 HOURS
Qty: 270 TABLET | Refills: 11 | Status: ACTIVE | COMMUNITY
Start: 2024-01-18

## 2025-05-22 RX ORDER — CHLORDIAZEPOXIDE HYDROCHLORIDE AND CLIDINIUM BROMIDE 5; 2.5 MG/1; MG/1
1 CAPSULE ORAL BID PRN
Qty: 20 | Refills: 0 | Status: ACTIVE | COMMUNITY
Start: 2020-10-02

## 2025-05-22 RX ORDER — BALSALAZIDE DISODIUM 750 MG/1
9 CAPSULES CAPSULE ORAL DAILY
Qty: 810 CAPSULE | Refills: 1 | Status: ACTIVE | COMMUNITY

## 2025-05-22 RX ORDER — HYOSCYAMINE SULFATE 0.12 MG/1
1 TABLET AS NEEDED TABLET ORAL
Qty: 30 | Refills: 1 | Status: ACTIVE | COMMUNITY

## 2025-05-22 RX ORDER — PREDNISONE 10 MG/1
2 TABLETS TABLET ORAL ONCE A DAY
Qty: 60 | Refills: 0 | Status: ACTIVE | COMMUNITY
Start: 2025-04-28

## 2025-05-22 RX ORDER — BALSALAZIDE DISODIUM 750 MG/1
9 CAPSULES CAPSULE ORAL DAILY
Qty: 810 CAPSULE | Refills: 4 | OUTPATIENT
Start: 2025-05-22

## 2025-05-22 NOTE — HPI-TODAY'S VISIT:
Pt Yobani is a 46 y/o indiv with pan-UC, currently on Balasalizde 9 tab, here for evaluation of refractory dz. . Pt is referred by Dr. Chen. . Pt was diagnosed with UC 1999.  He was started on azulfidine/sulfasalazine, developed hives on it.  Eventually he was started on another ASA.  Eventually he started on mesalamine.  He was hospitalized in 2020 for flares. . Previously on 10/26/2020, pt reports that he was started on prednisone 40mg.  He was recently hospitalized at [facility] for dehydration and abdominal pain.  He was found to have low electrolytes levels and given IV steroids and IV pain control.  He also has had a lot of rectal bleeding.  His Hgb is 8.8 currently (at [facility]).  He has up to 4 BM per day, with blood and mucus. . Previously 11/11/2020, pt reports that originally after hospital discharge, he was having severe pain and discomfort and lost 25lbs of weight.  He then started to force himself to walk around.  He has also had difficulty eating and drinking.  About 1 week ago, things started to get better 3-4 BM per day. He is on 30mg of prednisone.  No blood or mucus in the stool.  He has pasty stool. . Previously on 2/4/2021, pt reports that he continues to have liquid stool, has up to 4-5 BM per day.  On prednisone 10mg, gained about 5lbs.  He got his iron infusion, which did give him more energy.   . Previously on 12/13/2021, pt reports that he misses his medication about 1 time per week.  Has sometimes solid stools.  Occasional blood in the stool.  He has lot of urgency of BM.  . Previously on 3/31/2023, pt reports that he is doing well overall.  He has noticed that his stool is little loose.  No urgency or blood.   Prev on 1/18/2024, pt reports that he has 3-4 BM per day, loose/oatmeal consistency, no blood and no pain.  No GERD . Today on 5/22/2025, still has loose BM, no blood in the stool.  No abd pain or weight loss.  Unsure how much fiber he eats. . SH: no smoke/occasional etoh;  FH: No IBD PMH: UC; hyperlipidemia; enlarged prostate . 10/2023: A Ascending colon, biopsyColonic mucosa with no significant histopathology. No histologic evidence of active colitis, granulomata or dysplasia.B Transverse colon, biopsyColonic mucosa with no significant histopathology. No histologic evidence of active colitis, granulomata or dysplasia.C Descending colon, polypectomyHyperplastic colonic mucosa. No dysplasia seen.D Descending colon, biopsyFocal active colitis. No granulomas or dysplasia seen.ESigmoid colon, biopsyColonic mucosa with no significant histopathology. No histologic evidence of active colitis, granulomata or dysplasia.FRectum, biopsyColonic mucosa with no significant histopathology. No histologic evidence of active colitis, granulomata or dysplasia.. . 11/2021: Discontinuous moderate inflammation characterized by erythema, friability and granularity was found in the ascending colon. Biopsies were taken with a cold forceps for histology. The pathology specimen was placed into Bottle Number 1. Findings: Diffuse moderate inflammation characterized by erosions, erythema and friability was found in the transverse colon. Biopsies were taken with a cold forceps for histology. The pathology specimen was placed into Bottle Number 2. Diffuse moderate inflammation characterized by congestion (edema), erosions, erythema and friability was found in the descending colon. Biopsies were taken with a cold forceps for histology. The pathology specimen was placed into Bottle Number 3. Diffuse moderate inflammation characterized by erosions and erythema was found in the sigmoid colon. Biopsies were taken with a cold forceps for histology. The pathology specimen was placed into Bottle Number 4. . A Ascending colon, biopsy Focal active colitis. No granulomas or dysplasia seen. B Transverse colon, biopsy Focal active colitis with architectural disarray. No granulomas or dysplasia seen. C Descending colon, biopsy Mild active colitis with architectural disarray. No granulomas or dysplasia seen. D Sigmoid colon, biopsy Focal active colitis with Paneth cell metaplasia. No granulomas or dysplasia seen. . 10/2020: Colon, Random Biopsy: DIFFUSE CHRONIC ACTIVE COLITIS WITH ULCER AND GRANULATION TISSUE, CONSISTENT WITH ULCERATIVE COLITIS. See Comment. Negative for Infectious Organisms, Dysplasia or Malignancy. COMMENT: The biopsies show a diffuse and uniform chronic active colitis with a universal involvement of the rectum and absence of granulomas, supporting a diagnosis of ulcerative colitis. The presence of histological features of chronicity excludes acute infectious colitis. . 9/2019: A patchy area of mucosa in the terminal ileum was mildly nodular.  Biopsies were taken with a cold forceps for histology. Estimated blood loss was minimal. The remainder of the exam in the terminal ileum was normal. A patchy area of mild to moderate erythematous, granular and scarred mucosa was found in the rectum, in the recto- sigmoid colon, in the sigmoid colon, in the descending colon, at the splenic flexure, in the transverse colon, at the hepatic flexure, in the ascending colon and in the cecum.  Biopsies were taken with a cold forceps for histology. Estimated blood loss was minimal. The exam was otherwise normal throughout the examined colon. . A.	Terminal	I leum	,	Biopsy: -No	diagnostic	abnormality. -Normal	villous	architecture. -There	is	no	evidence	of	active	or	chronic	enteritis. -Negative	for	dysplasia	and	malignancy. B.	Random	Colon	,	Biopsy: -Chronic	active	colitis,	mild,	consistent	with	history	of	Ulcerative	Colitis. -No	granulomas,	negative	for	dysplasia	or	malignancy. . 10/2018: A.	Random	Colon	,	Biopsy: -Chronic	active	colitis,	mild,	consistent	with	history	of	Ulcerative	Colitis. -No	granulomas,	negative	for	dysplasia	or	malignancy. . 6/2017: A. TERMINAL ILEUM (BIOPSY), R/O COLITIS ILEAL MUCOSA WITH NO SIGNIFICANT HISTOPATHOLOGIC CHANGES. Negative for ileitis. B. CECUM (BIOPSY), R/O COLITIS COLONIC MUCOSA WITH NO SIGNIFICANT HISTOPATHOLOGIC CHANGES. Negative for crypt distortion, disarray, and significant inflammation. Negative for dysplasia. C. ASCENDING COLON (BIOPSY), R/O COLITIS COLONIC MUCOSA WITH NO SIGNIFICANT HISTOPATHOLOGIC CHANGES. Negative for crypt distortion, disarray, and significant inflammation. Negative for dysplasia. D. TRANSVERSE COLON (BIOPSY), R/O COLITIS COLONIC MUCOSA WITH NO SIGNIFICANT HISTOPATHOLOGIC CHANGES. Negative for crypt distortion, disarray, and significant inflammation. Negative for dysplasia. E. DESCENDING COLON (BIOPSY), R/O COLITIS COLONIC MUCOSA WITH NO SIGNIFICANT HISTOPATHOLOGIC CHANGES. Negative for crypt distortion, disarray, and significant inflammation. Negative for dysplasia. F. SIGMOID COLON (BIOPSY), R/O COLITIS COLONIC MUCOSA WITH FOCAL ACUTE HEMORRHAGE AND NO SIGNIFICANT HISTOPATHOLOGIC  CHANGES. Negative for crypt distortion, disarray, and significant inflammation. Negative for dysplasia. G. RECTUM (BIOPSY), R/O COLITIS QUIESCENT ULCERATIVE COLITIS WITH FOCAL LYMPHOID AGGREGATE. Negative for acute inflammation and dysplasia. . 6/2016: A. CECUM-BIOPSY MILD CHRONIC ULCERATIVE COLITIS WITH MINIMAL ACTIVITY.  NO EVIDENCE OF DYSPLASIA. B. ASCENDING COLON-BIOPSY CONSISTENT WITH QUIESCENT ULCERATIVE COLITIS, NO ACTIVE INFLAMMATION OR DYSPLASIA. C. TRANSVERSE COLON-BIOPSY CONSISTENT WITH QUIESCENT ULCERATIVE COLITIS, NO ACTIVE INFLAMMATION OR DYSPLASIA. D. DESCENDING COLON-BIOPSY MILD CHRONIC ULCERATIVE COLITIS WITH MINIMAL ACTIVITY.  NO EVIDENCE OF DYSPLASIA. E. SIGMOID COLON-BIOPSY MILD CHRONIC ULCERATIVE COLITIS WITH MINIMAL ACTIVITY.  NO EVIDENCE OF DYSPLASIA. F. RECTUM-BIOPSY MILD CHRONIC ULCERATIVE PROCTITIS WITH MINIMAL ACTIVITY.  NO EVIDENCE OF DYSPLASIA.

## 2025-05-29 ENCOUNTER — TELEPHONE ENCOUNTER (OUTPATIENT)
Dept: URBAN - METROPOLITAN AREA CLINIC 96 | Facility: CLINIC | Age: 45
End: 2025-05-29